# Patient Record
Sex: MALE | Race: WHITE | HISPANIC OR LATINO | URBAN - METROPOLITAN AREA
[De-identification: names, ages, dates, MRNs, and addresses within clinical notes are randomized per-mention and may not be internally consistent; named-entity substitution may affect disease eponyms.]

---

## 2017-09-25 ENCOUNTER — HOSPITAL ENCOUNTER (EMERGENCY)
Facility: HOSPITAL | Age: 32
Discharge: HOME | End: 2017-09-25
Attending: EMERGENCY MEDICINE
Payer: COMMERCIAL

## 2017-09-25 VITALS
TEMPERATURE: 99 F | BODY MASS INDEX: 28.79 KG/M2 | DIASTOLIC BLOOD PRESSURE: 79 MMHG | WEIGHT: 190 LBS | OXYGEN SATURATION: 100 % | HEIGHT: 68 IN | RESPIRATION RATE: 18 BRPM | SYSTOLIC BLOOD PRESSURE: 116 MMHG | HEART RATE: 61 BPM

## 2017-09-25 DIAGNOSIS — S39.012A BACK STRAIN, INITIAL ENCOUNTER: Primary | ICD-10-CM

## 2017-09-25 PROCEDURE — 99283 EMERGENCY DEPT VISIT LOW MDM: CPT | Mod: 25

## 2017-09-25 PROCEDURE — 96372 THER/PROPH/DIAG INJ SC/IM: CPT

## 2017-09-25 PROCEDURE — 63600175 PHARM REV CODE 636 W HCPCS: Performed by: PHYSICIAN ASSISTANT

## 2017-09-25 RX ORDER — IBUPROFEN 600 MG/1
600 TABLET ORAL EVERY 6 HOURS PRN
Qty: 20 TABLET | Refills: 0 | Status: SHIPPED | OUTPATIENT
Start: 2017-09-25 | End: 2017-09-30

## 2017-09-25 RX ORDER — ORPHENADRINE CITRATE 30 MG/ML
30 INJECTION INTRAMUSCULAR; INTRAVENOUS
Status: COMPLETED | OUTPATIENT
Start: 2017-09-25 | End: 2017-09-25

## 2017-09-25 RX ORDER — METHOCARBAMOL 500 MG/1
1000 TABLET, FILM COATED ORAL 3 TIMES DAILY
Qty: 30 TABLET | Refills: 0 | Status: SHIPPED | OUTPATIENT
Start: 2017-09-25 | End: 2017-09-30

## 2017-09-25 RX ORDER — KETOROLAC TROMETHAMINE 30 MG/ML
15 INJECTION, SOLUTION INTRAMUSCULAR; INTRAVENOUS
Status: COMPLETED | OUTPATIENT
Start: 2017-09-25 | End: 2017-09-25

## 2017-09-25 RX ADMIN — KETOROLAC TROMETHAMINE 15 MG: 30 INJECTION, SOLUTION INTRAMUSCULAR at 09:09

## 2017-09-25 RX ADMIN — ORPHENADRINE CITRATE 30 MG: 30 INJECTION INTRAMUSCULAR; INTRAVENOUS at 09:09

## 2017-09-25 NOTE — ED TRIAGE NOTES
C/o lower back pain x   2 days. Reports  Bending over picking up a laundry basket.  excerdine taken yesterday with no relief.

## 2017-09-25 NOTE — ED PROVIDER NOTES
Encounter Date: 9/25/2017    SCRIBE #1 NOTE: I, Elio Apolonia, am scribing for, and in the presence of, Lisa Botello PA-C. Other sections scribed: HPI, ROS.       History     Chief Complaint   Patient presents with    Back Pain     lower back pains after lifting heavy object Saturday.     CC: Back Pain  HPI: This 31 y.o. male presents to the ED c/o moderate to severe non-radiating pain in the middle of his low back that began after lifting up on a full laundry basket 2 days ago. He states that the pain has gradually worsened since onset. He states that he only has pain when he stands up or moves around. He denies Hx of back problems. He denies numbness, weakness, tingling, bowel or bladder incontinence, or saddle anesthesia.        The history is provided by the patient.     Review of patient's allergies indicates:  No Known Allergies  History reviewed. No pertinent past medical history.  History reviewed. No pertinent surgical history.  History reviewed. No pertinent family history.  Social History   Substance Use Topics    Smoking status: Never Smoker    Smokeless tobacco: Never Used    Alcohol use Yes      Comment:  3 times week     Review of Systems   Constitutional: Negative for chills and fever.   Eyes: Negative for pain and visual disturbance.   Respiratory: Negative for shortness of breath.    Gastrointestinal: Negative for abdominal pain, nausea and vomiting.        (-) bowel incontinence   Genitourinary:        (-) bladder incontinence   Musculoskeletal: Positive for back pain.   Skin: Negative for wound.   Neurological: Negative for weakness and numbness.        (-) paresthesias        Physical Exam     Initial Vitals [09/25/17 0819]   BP Pulse Resp Temp SpO2   (!) 150/67 63 16 97.9 °F (36.6 °C) 98 %      MAP       94.67         Physical Exam    Nursing note and vitals reviewed.  Constitutional: He appears well-developed and well-nourished. No distress.   Cardiovascular: Regular rhythm. Bradycardia  present.  Exam reveals no gallop and no friction rub.    No murmur heard.  Pulses:       Dorsalis pedis pulses are 2+ on the right side, and 2+ on the left side.   Pulmonary/Chest: Effort normal and breath sounds normal. He has no decreased breath sounds. He has no wheezes. He has no rhonchi. He has no rales.   Musculoskeletal:   No midline TTP of spine or paraspinal musculature. Negative SLR bilaterally. 5/5 strength bilaterally. Pain reproduced with bending forward and walking.    Neurological: He has normal strength. No sensory deficit. Gait normal.   Skin: Skin is warm and dry. No rash noted.   Psychiatric: He has a normal mood and affect.         ED Course   Procedures  Labs Reviewed - No data to display          Medical Decision Making:   Initial Assessment:   Patient is a 29-year-old male who presents for evaluation of 2 day history of midline lumbar back pain that occurred after lifting.  Patient denies history of back pain prior to 2 days ago.  He denies weakness, paresthesias, bowel or bladder incontinence or saddle anesthesia.  Patient is afebrile, well-appearing in acute distress on exam, there is no TTP to the spine at midline or the paraspinal musculature.  Straight-leg raise test negative bilaterally.  Pain is reproduced only with flexion of the spine and walking.  Normal strength.  No sensory deficits.  2+ DP pulses.  Considered but doubt fracture or dislocation, cauda equina, herniated disc.  This is likely back strain.  Patient given Toradol and Norflex in ED.  Discharge patient home with ibuprofen and Robaxin.  PCP follow-up in 2 days.  Ortho follow-up if symptoms persist or worsen.  ER return precautions discussed waiting worsening pain, bowel or bladder incontinence, saddle anesthesia or as needed.  I discussed this patient with Dr. Robison who agrees with the assessment and plan.            Scribe Attestation:   Scribe #1: I performed the above scribed service and the documentation accurately  describes the services I performed. I attest to the accuracy of the note.    Attending Attestation:           Physician Attestation for Scribe:  Physician Attestation Statement for Scribe #1: I, Lisa Munguia PA-C, reviewed documentation, as scribed by Elio Barksdale in my presence, and it is both accurate and complete.                 ED Course      Clinical Impression:   The encounter diagnosis was Back strain, initial encounter.                           Lisa Munguia PA-C  09/25/17 2048

## 2017-09-25 NOTE — DISCHARGE INSTRUCTIONS
Take Ibuprofen and Robaxin as prescribed for pain.    Follow up with primary care in 2 days. Ortho follow up if symptoms worsen or as needed.     Return to ER if you develop worsening symptoms, bowel or bladder incontinence, numbness or tingling in groin or rectal region,

## 2024-02-29 ENCOUNTER — HOSPITAL ENCOUNTER (INPATIENT)
Facility: HOSPITAL | Age: 39
LOS: 4 days | Discharge: HOME | DRG: 920 | End: 2024-03-04
Attending: EMERGENCY MEDICINE | Admitting: STUDENT IN AN ORGANIZED HEALTH CARE EDUCATION/TRAINING PROGRAM
Payer: COMMERCIAL

## 2024-02-29 DIAGNOSIS — R07.9 CHEST PAIN: ICD-10-CM

## 2024-02-29 DIAGNOSIS — R04.0 RECURRENT EPISTAXIS: Primary | ICD-10-CM

## 2024-02-29 DIAGNOSIS — R55 SYNCOPE, UNSPECIFIED SYNCOPE TYPE: ICD-10-CM

## 2024-02-29 DIAGNOSIS — R55 SYNCOPE AND COLLAPSE: ICD-10-CM

## 2024-02-29 DIAGNOSIS — R00.0 TACHYCARDIA: ICD-10-CM

## 2024-02-29 DIAGNOSIS — D64.9 SYMPTOMATIC ANEMIA: ICD-10-CM

## 2024-02-29 PROBLEM — D72.829 LEUKOCYTOSIS: Status: ACTIVE | Noted: 2024-02-29

## 2024-02-29 PROBLEM — N17.9 ACUTE KIDNEY INJURY: Status: ACTIVE | Noted: 2024-02-29

## 2024-02-29 LAB
ABO + RH BLD: NORMAL
ALBUMIN SERPL BCP-MCNC: 4.1 G/DL (ref 3.5–5.2)
ALP SERPL-CCNC: 81 U/L (ref 55–135)
ALT SERPL W/O P-5'-P-CCNC: 34 U/L (ref 10–44)
ANION GAP SERPL CALC-SCNC: 10 MMOL/L (ref 8–16)
ANION GAP SERPL CALC-SCNC: 8 MMOL/L (ref 8–16)
APTT PPP: 29.5 SEC (ref 21–32)
AST SERPL-CCNC: 22 U/L (ref 10–40)
BASOPHILS # BLD AUTO: 0.04 K/UL (ref 0–0.2)
BASOPHILS # BLD AUTO: 0.05 K/UL (ref 0–0.2)
BASOPHILS NFR BLD: 0.3 % (ref 0–1.9)
BASOPHILS NFR BLD: 0.4 % (ref 0–1.9)
BILIRUB SERPL-MCNC: 0.4 MG/DL (ref 0.1–1)
BLD GP AB SCN CELLS X3 SERPL QL: NORMAL
BUN SERPL-MCNC: 16 MG/DL (ref 6–20)
BUN SERPL-MCNC: 20 MG/DL (ref 6–20)
CALCIUM SERPL-MCNC: 8.7 MG/DL (ref 8.7–10.5)
CALCIUM SERPL-MCNC: 9.4 MG/DL (ref 8.7–10.5)
CHLORIDE SERPL-SCNC: 104 MMOL/L (ref 95–110)
CHLORIDE SERPL-SCNC: 107 MMOL/L (ref 95–110)
CO2 SERPL-SCNC: 20 MMOL/L (ref 23–29)
CO2 SERPL-SCNC: 26 MMOL/L (ref 23–29)
CREAT SERPL-MCNC: 1.4 MG/DL (ref 0.5–1.4)
CREAT SERPL-MCNC: 1.5 MG/DL (ref 0.5–1.4)
DIFFERENTIAL METHOD BLD: ABNORMAL
DIFFERENTIAL METHOD BLD: ABNORMAL
EOSINOPHIL # BLD AUTO: 0.2 K/UL (ref 0–0.5)
EOSINOPHIL # BLD AUTO: 0.3 K/UL (ref 0–0.5)
EOSINOPHIL NFR BLD: 1.5 % (ref 0–8)
EOSINOPHIL NFR BLD: 2.5 % (ref 0–8)
ERYTHROCYTE [DISTWIDTH] IN BLOOD BY AUTOMATED COUNT: 12.7 % (ref 11.5–14.5)
ERYTHROCYTE [DISTWIDTH] IN BLOOD BY AUTOMATED COUNT: 12.8 % (ref 11.5–14.5)
EST. GFR  (NO RACE VARIABLE): >60 ML/MIN/1.73 M^2
EST. GFR  (NO RACE VARIABLE): >60 ML/MIN/1.73 M^2
GLUCOSE SERPL-MCNC: 105 MG/DL (ref 70–110)
GLUCOSE SERPL-MCNC: 136 MG/DL (ref 70–110)
HCT VFR BLD AUTO: 44.8 % (ref 40–54)
HCT VFR BLD AUTO: 49.5 % (ref 40–54)
HGB BLD-MCNC: 15.7 G/DL (ref 14–18)
HGB BLD-MCNC: 17.3 G/DL (ref 14–18)
IMM GRANULOCYTES # BLD AUTO: 0.04 K/UL (ref 0–0.04)
IMM GRANULOCYTES # BLD AUTO: 0.11 K/UL (ref 0–0.04)
IMM GRANULOCYTES NFR BLD AUTO: 0.4 % (ref 0–0.5)
IMM GRANULOCYTES NFR BLD AUTO: 0.7 % (ref 0–0.5)
INR PPP: 1 (ref 0.8–1.2)
LYMPHOCYTES # BLD AUTO: 2.9 K/UL (ref 1–4.8)
LYMPHOCYTES # BLD AUTO: 4 K/UL (ref 1–4.8)
LYMPHOCYTES NFR BLD: 25.9 % (ref 18–48)
LYMPHOCYTES NFR BLD: 27.6 % (ref 18–48)
MCH RBC QN AUTO: 30.4 PG (ref 27–31)
MCH RBC QN AUTO: 31.6 PG (ref 27–31)
MCHC RBC AUTO-ENTMCNC: 34.9 G/DL (ref 32–36)
MCHC RBC AUTO-ENTMCNC: 35 G/DL (ref 32–36)
MCV RBC AUTO: 87 FL (ref 82–98)
MCV RBC AUTO: 90 FL (ref 82–98)
MONOCYTES # BLD AUTO: 1.1 K/UL (ref 0.3–1)
MONOCYTES # BLD AUTO: 1.7 K/UL (ref 0.3–1)
MONOCYTES NFR BLD: 10 % (ref 4–15)
MONOCYTES NFR BLD: 11.2 % (ref 4–15)
NEUTROPHILS # BLD AUTO: 6.3 K/UL (ref 1.8–7.7)
NEUTROPHILS # BLD AUTO: 9.3 K/UL (ref 1.8–7.7)
NEUTROPHILS NFR BLD: 59.1 % (ref 38–73)
NEUTROPHILS NFR BLD: 60.4 % (ref 38–73)
NRBC BLD-RTO: 0 /100 WBC
NRBC BLD-RTO: 0 /100 WBC
PLATELET # BLD AUTO: 413 K/UL (ref 150–450)
PLATELET # BLD AUTO: 426 K/UL (ref 150–450)
PMV BLD AUTO: 8.8 FL (ref 9.2–12.9)
PMV BLD AUTO: 9 FL (ref 9.2–12.9)
POCT GLUCOSE: 115 MG/DL (ref 70–110)
POTASSIUM SERPL-SCNC: 3.5 MMOL/L (ref 3.5–5.1)
POTASSIUM SERPL-SCNC: 4.3 MMOL/L (ref 3.5–5.1)
PROT SERPL-MCNC: 8.2 G/DL (ref 6–8.4)
PROTHROMBIN TIME: 11 SEC (ref 9–12.5)
RBC # BLD AUTO: 5.16 M/UL (ref 4.6–6.2)
RBC # BLD AUTO: 5.48 M/UL (ref 4.6–6.2)
SODIUM SERPL-SCNC: 137 MMOL/L (ref 136–145)
SODIUM SERPL-SCNC: 138 MMOL/L (ref 136–145)
SPECIMEN OUTDATE: NORMAL
WBC # BLD AUTO: 10.58 K/UL (ref 3.9–12.7)
WBC # BLD AUTO: 15.42 K/UL (ref 3.9–12.7)

## 2024-02-29 PROCEDURE — 093K8ZZ CONTROL BLEEDING IN NASAL MUCOSA AND SOFT TISSUE, VIA NATURAL OR ARTIFICIAL OPENING ENDOSCOPIC: ICD-10-PCS | Performed by: OTOLARYNGOLOGY

## 2024-02-29 PROCEDURE — 80048 BASIC METABOLIC PNL TOTAL CA: CPT | Mod: XB | Performed by: STUDENT IN AN ORGANIZED HEALTH CARE EDUCATION/TRAINING PROGRAM

## 2024-02-29 PROCEDURE — 99291 CRITICAL CARE FIRST HOUR: CPT | Mod: 25

## 2024-02-29 PROCEDURE — 86901 BLOOD TYPING SEROLOGIC RH(D): CPT | Performed by: STUDENT IN AN ORGANIZED HEALTH CARE EDUCATION/TRAINING PROGRAM

## 2024-02-29 PROCEDURE — 25000003 PHARM REV CODE 250: Performed by: STUDENT IN AN ORGANIZED HEALTH CARE EDUCATION/TRAINING PROGRAM

## 2024-02-29 PROCEDURE — 63600175 PHARM REV CODE 636 W HCPCS: Performed by: INTERNAL MEDICINE

## 2024-02-29 PROCEDURE — 25000003 PHARM REV CODE 250: Performed by: PHYSICIAN ASSISTANT

## 2024-02-29 PROCEDURE — 11000001 HC ACUTE MED/SURG PRIVATE ROOM

## 2024-02-29 PROCEDURE — 85025 COMPLETE CBC W/AUTO DIFF WBC: CPT | Performed by: STUDENT IN AN ORGANIZED HEALTH CARE EDUCATION/TRAINING PROGRAM

## 2024-02-29 PROCEDURE — 63600175 PHARM REV CODE 636 W HCPCS: Performed by: STUDENT IN AN ORGANIZED HEALTH CARE EDUCATION/TRAINING PROGRAM

## 2024-02-29 PROCEDURE — 30901 CONTROL OF NOSEBLEED: CPT | Mod: 59,LT,, | Performed by: OTOLARYNGOLOGY

## 2024-02-29 PROCEDURE — 85610 PROTHROMBIN TIME: CPT

## 2024-02-29 PROCEDURE — 96361 HYDRATE IV INFUSION ADD-ON: CPT

## 2024-02-29 PROCEDURE — 99223 1ST HOSP IP/OBS HIGH 75: CPT | Mod: 25,,, | Performed by: OTOLARYNGOLOGY

## 2024-02-29 PROCEDURE — 80053 COMPREHEN METABOLIC PANEL: CPT

## 2024-02-29 PROCEDURE — 93005 ELECTROCARDIOGRAM TRACING: CPT

## 2024-02-29 PROCEDURE — 31231 NASAL ENDOSCOPY DX: CPT | Mod: ,,, | Performed by: OTOLARYNGOLOGY

## 2024-02-29 PROCEDURE — 2Y41X5Z PACKING OF NASAL REGION USING PACKING MATERIAL: ICD-10-PCS | Performed by: OTOLARYNGOLOGY

## 2024-02-29 PROCEDURE — 85025 COMPLETE CBC W/AUTO DIFF WBC: CPT | Mod: 91

## 2024-02-29 PROCEDURE — 93010 ELECTROCARDIOGRAM REPORT: CPT | Mod: ,,, | Performed by: INTERNAL MEDICINE

## 2024-02-29 PROCEDURE — 82962 GLUCOSE BLOOD TEST: CPT

## 2024-02-29 PROCEDURE — 96374 THER/PROPH/DIAG INJ IV PUSH: CPT

## 2024-02-29 PROCEDURE — 85730 THROMBOPLASTIN TIME PARTIAL: CPT

## 2024-02-29 RX ORDER — ONDANSETRON HYDROCHLORIDE 2 MG/ML
4 INJECTION, SOLUTION INTRAVENOUS EVERY 8 HOURS PRN
Status: DISCONTINUED | OUTPATIENT
Start: 2024-02-29 | End: 2024-03-04 | Stop reason: HOSPADM

## 2024-02-29 RX ORDER — SODIUM CHLORIDE, SODIUM LACTATE, POTASSIUM CHLORIDE, CALCIUM CHLORIDE 600; 310; 30; 20 MG/100ML; MG/100ML; MG/100ML; MG/100ML
INJECTION, SOLUTION INTRAVENOUS CONTINUOUS
Status: DISCONTINUED | OUTPATIENT
Start: 2024-02-29 | End: 2024-03-02

## 2024-02-29 RX ORDER — ACETAMINOPHEN 325 MG/1
650 TABLET ORAL EVERY 4 HOURS PRN
Status: DISCONTINUED | OUTPATIENT
Start: 2024-02-29 | End: 2024-03-04 | Stop reason: HOSPADM

## 2024-02-29 RX ORDER — ALUMINUM HYDROXIDE, MAGNESIUM HYDROXIDE, AND SIMETHICONE 1200; 120; 1200 MG/30ML; MG/30ML; MG/30ML
30 SUSPENSION ORAL 4 TIMES DAILY PRN
Status: DISCONTINUED | OUTPATIENT
Start: 2024-02-29 | End: 2024-03-04 | Stop reason: HOSPADM

## 2024-02-29 RX ORDER — TALC
6 POWDER (GRAM) TOPICAL NIGHTLY PRN
Status: DISCONTINUED | OUTPATIENT
Start: 2024-02-29 | End: 2024-03-04 | Stop reason: HOSPADM

## 2024-02-29 RX ORDER — OXYMETAZOLINE HCL 0.05 %
1 SPRAY, NON-AEROSOL (ML) NASAL
Status: COMPLETED | OUTPATIENT
Start: 2024-02-29 | End: 2024-02-29

## 2024-02-29 RX ORDER — SODIUM CHLORIDE 0.9 % (FLUSH) 0.9 %
10 SYRINGE (ML) INJECTION EVERY 12 HOURS PRN
Status: DISCONTINUED | OUTPATIENT
Start: 2024-02-29 | End: 2024-03-04 | Stop reason: HOSPADM

## 2024-02-29 RX ORDER — GLUCAGON 1 MG
1 KIT INJECTION
Status: DISCONTINUED | OUTPATIENT
Start: 2024-02-29 | End: 2024-03-04 | Stop reason: HOSPADM

## 2024-02-29 RX ORDER — IBUPROFEN 200 MG
24 TABLET ORAL
Status: DISCONTINUED | OUTPATIENT
Start: 2024-02-29 | End: 2024-03-04 | Stop reason: HOSPADM

## 2024-02-29 RX ORDER — ONDANSETRON HYDROCHLORIDE 2 MG/ML
4 INJECTION, SOLUTION INTRAVENOUS
Status: COMPLETED | OUTPATIENT
Start: 2024-02-29 | End: 2024-02-29

## 2024-02-29 RX ORDER — IBUPROFEN 200 MG
16 TABLET ORAL
Status: DISCONTINUED | OUTPATIENT
Start: 2024-02-29 | End: 2024-03-04 | Stop reason: HOSPADM

## 2024-02-29 RX ORDER — NALOXONE HCL 0.4 MG/ML
0.02 VIAL (ML) INJECTION
Status: DISCONTINUED | OUTPATIENT
Start: 2024-02-29 | End: 2024-03-04 | Stop reason: HOSPADM

## 2024-02-29 RX ADMIN — ONDANSETRON 4 MG: 2 INJECTION INTRAMUSCULAR; INTRAVENOUS at 02:02

## 2024-02-29 RX ADMIN — SODIUM CHLORIDE, POTASSIUM CHLORIDE, SODIUM LACTATE AND CALCIUM CHLORIDE: 600; 310; 30; 20 INJECTION, SOLUTION INTRAVENOUS at 10:02

## 2024-02-29 RX ADMIN — SODIUM CHLORIDE 1000 ML: 9 INJECTION, SOLUTION INTRAVENOUS at 05:02

## 2024-02-29 RX ADMIN — SODIUM CHLORIDE 1000 ML: 9 INJECTION, SOLUTION INTRAVENOUS at 03:02

## 2024-02-29 RX ADMIN — Medication 1 SPRAY: at 02:02

## 2024-02-29 NOTE — ED PROVIDER NOTES
Encounter Date: 2/29/2024       History     Chief Complaint   Patient presents with    Epistaxis     Pt reports having septoplasty surgery on Friday and began having a continuous nose bleed starting about 30-45 min PTA. Pt denies pain.      38-year-old male with no reported past medical history presents to the ED today for evaluation of nosebleed.  Patient reports he [facility]  He reports he was sent home without any nasal packing.  He was doing well until 45 minutes prior to arrival to the emergency room, when he started to experience severe bleeding from both nostrils of his nose.  He was unable to get the bleeding control which is what brought him into the emergency department.  At this time he denies feeling short of breath, lightheaded, dizzy.  Denies taking any blood thinners on a daily basis.  He is not currently in any pain.    The history is provided by the patient. No  was used.     Review of patient's allergies indicates:  No Known Allergies  History reviewed. No pertinent past medical history.  History reviewed. No pertinent surgical history.  History reviewed. No pertinent family history.  Social History     Tobacco Use    Smoking status: Never    Smokeless tobacco: Never   Substance Use Topics    Alcohol use: Yes     Comment:  3 times week     Review of Systems   Constitutional:  Negative for chills, fatigue and fever.   HENT:  Positive for nosebleeds. Negative for congestion, sinus pressure, sneezing and sore throat.    Eyes:  Negative for visual disturbance.   Respiratory:  Negative for cough and shortness of breath.    Cardiovascular:  Negative for chest pain.   Gastrointestinal:  Negative for abdominal pain, nausea and vomiting.   Genitourinary:  Negative for dysuria.   Musculoskeletal:  Negative for back pain.   Skin:  Negative for rash.   Neurological:  Negative for dizziness, syncope, weakness and light-headedness.    Hematological:  Does not bruise/bleed easily.       Physical Exam     Initial Vitals [02/29/24 1333]   BP Pulse Resp Temp SpO2   (!) 146/93 110 18 98 °F (36.7 °C) 98 %      MAP       --         Physical Exam    Nursing note and vitals reviewed.  Constitutional: He appears well-developed and well-nourished. He is not diaphoretic. He appears distressed.   HENT:   Head: Normocephalic and atraumatic.   Right Ear: External ear normal.   Left Ear: External ear normal.   Nose: Epistaxis is observed.   Mouth/Throat: No trismus in the jaw.   Profuse blood coming from both nares.  No visible identification of source of bleeding due to obstructed view.  There is blood coming from the left tear duct as well.  No blood visualized in the bilateral ear canals and no hemotympanum visualized.   Cardiovascular:  Regular rhythm.   Tachycardia present.         Pulmonary/Chest: No respiratory distress.   Abdominal: He exhibits no distension.     Neurological: He is alert and oriented to person, place, and time. GCS score is 15. GCS eye subscore is 4. GCS verbal subscore is 5. GCS motor subscore is 6.   Skin: Skin is warm and dry.   Psychiatric: He has a normal mood and affect. His behavior is normal. Thought content normal.         ED Course   Critical Care    Date/Time: 2/29/2024 7:00 PM    Performed by: Marleny Daniel DO  Authorized by: Marleny Daniel DO  Direct patient critical care time: 25 minutes  Additional history critical care time: 10 minutes  Ordering / reviewing critical care time: 10 minutes  Documentation critical care time: 5 minutes  Consulting other physicians critical care time: 10 minutes  Total critical care time (exclusive of procedural time) : 60 minutes  Critical care time was exclusive of separately billable procedures and treating other patients and teaching time.  Critical care was necessary to treat or prevent imminent or life-threatening deterioration of the following conditions:  shock.  Critical care was time spent personally by me on the following activities: development of treatment plan with patient or surrogate, discussions with consultants, interpretation of cardiac output measurements, evaluation of patient's response to treatment, examination of patient, obtaining history from patient or surrogate, ordering and performing treatments and interventions, ordering and review of laboratory studies, re-evaluation of patient's condition, pulse oximetry and review of old charts.        Labs Reviewed   CBC W/ AUTO DIFFERENTIAL - Abnormal; Notable for the following components:       Result Value    MCH 31.6 (*)     MPV 8.8 (*)     Mono # 1.1 (*)     All other components within normal limits   CBC W/ AUTO DIFFERENTIAL - Abnormal; Notable for the following components:    WBC 15.42 (*)     MPV 9.0 (*)     Immature Granulocytes 0.7 (*)     Gran # (ANC) 9.3 (*)     Immature Grans (Abs) 0.11 (*)     Mono # 1.7 (*)     All other components within normal limits   BASIC METABOLIC PANEL - Abnormal; Notable for the following components:    CO2 20 (*)     Glucose 136 (*)     Creatinine 1.5 (*)     All other components within normal limits   POCT GLUCOSE - Abnormal; Notable for the following components:    POCT Glucose 115 (*)     All other components within normal limits   COMPREHENSIVE METABOLIC PANEL   PROTIME-INR   APTT   TYPE & SCREEN   POCT GLUCOSE MONITORING CONTINUOUS     EKG Readings: (Independently Interpreted)   Sinus tachycardia with a rate of 103 beats per minute, nonspecific T-wave inversions in leads 3, AVF and V6  with no obvious reciprocal changes.  No obvious acute ST segment changes.  No old EKG in epic to compare.       Imaging Results    None          Medications   oxymetazoline 0.05 % nasal spray 1 spray (1 spray Each Nostril Given 2/29/24 1414)   oxymetazoline 0.05 % nasal spray 1 spray (1 spray Each Nostril Given 2/29/24 1409)   ondansetron injection 4 mg (4 mg Intravenous Given  2/29/24 1431)   sodium chloride 0.9% bolus 1,000 mL 1,000 mL (0 mLs Intravenous Stopped 2/29/24 1617)   sodium chloride 0.9% bolus 1,000 mL 1,000 mL (0 mLs Intravenous Stopped 2/29/24 1802)     Medical Decision Making  This is a 38-year-old male with no reported past medical history presents to the ED today for evaluation of nosebleed.  Patient reports he [facility].  He reports he was sent home without any nasal packing.  He was doing well until 45 minutes prior to arrival to the emergency room, when he started to experience severe bleeding from both nostrils of his nose.  He was unable to get the bleeding control which is what brought him into the emergency department.  Upon initial evaluation there is profuse blood coming from both nares.  He was obstructed and I can not identify a source of bleeding.  I did immediately seek help from my attending, Dr. Donny santamaria, who assisted in his case from this point onward.  ENT consulted and Dr. Quiroz came down to evaluate the pt and help stop bleeding. Afrin and absorbable packing was used, however pt blew out the packing shortly after it was placed. He had a possible seizure vs vasovagal syncopal episode in the ED (after he blew out the packing) and has subsequently had another presyncopal episode while in ED. Hgb initally 17.3 upong arrival, then 15.7 on recheck. He has gotten 2 liters of NS due to low BP. Dr. Quiroz did come see him again and placed additional absorbable packing that is now in place. Attending Dr. Daniel advises for pt to be observed tonight given decrease in hemoglobin and syncopal episode. Dr. Quiroz agreeable check on pt again in the morning.  I spoke to Hospital Medicine, who is agreeable to admit the patient to inpatient under attending Dr. John Hyde.  Patient stable and feeling better at time of hospital admission.    Of note: Differential diagnosis to include but  not limited to:  Epistaxis, recurrent epistaxis, symptomatic anemia    Helen Dove PA-C    DISCLAIMER: This note was prepared with [employer] voice recognition transcription software. Garbled syntax, mangled pronouns, and other bizarre constructions may be attributed to that software system. If you have any questions regarding information in this note please contact me.           Amount and/or Complexity of Data Reviewed  Labs: ordered.    Risk  OTC drugs.  Prescription drug management.  Decision regarding hospitalization.              Attending Attestation:     Physician Attestation Statement for NP/PA:       Other NP/PA Attestation Additions:      Medical Decision Making:  [facility]  This is an emergent evaluation of a 38 y.o. male who had a [facility] who presents with persistent nosebleed that started today prior to ED arrival.  No attempts at treatment prior to ED arrival.  Initial vitals in the ED  [02/29/24 1333]  BP: (!) 146/93  Pulse: 110  Resp: 18  Temp: 98 °F (36.7 °C)  SpO2: 98 % .     Physical exam reveals a male who appears to be in no respiratory distress however he was standing over the sink with profuse bleeding from bilateral nostrils worse on the left.  Unable to fully visualize nostrils given large amount of blood.  There is intermittent large blood clots present.  Patient also appears to have small amount of blood coming from the oropharynx.  Patient also tachycardic with a regular rhythm.  No focal neurological deficits.  Moving all extremities.  No obvious pallor or other external injuries.  DDx includes but is not limited to postop nosebleed, anterior versus posterior epistaxis, symptomatic anemia. Also considered but clinically less likely to be of arrhythmia, ACS, stroke.  Patient was consult to ENT and was evaluated by Dr. Quiroz.  She was able to place Surgicel packing.  After patient initially packed, he had an episode where his family reported  whole-body shaking and patient briefly losing consciousness.  On my assessment of the patient.  He was awake alert with no seizure-like activity.  He had accidentally removed the nasal packing.  Had minimal nosebleeding at that time.  Rechecked labs which showed his hemoglobin had dropped from 17.3 to 15 0.7.  Also had a minimal leukocytosis which is likely stress induced.  Remainder of labs unremarkable.  Patient was treated with IV fluid hydration.  Again  came down and place more packing.  Decision made to place the patient on hospital medicine service given drop in hemoglobin with significant bleeding in the ED today.  Patient and family are aware and agreeable to plan.  Vitals with improved tachycardia and remainder vitals also reassuring.    Marleny Daniel D.O  EMERGENCY MEDICINE   02/29/2024      This chart was completed using dictation software, as a result there may be some transcription errors                                     Clinical Impression:  Final diagnoses:  [R00.0] Tachycardia  [R04.0] Recurrent epistaxis (Primary)  [R55] Syncope, unspecified syncope type  [D64.9] Symptomatic anemia          ED Disposition Condition    Admit Stable                Helen Dove PA-C  02/29/24 1935       Marleny Daniel, DO  02/29/24 2021       Marleny Daniel, DO  02/29/24 2124

## 2024-02-29 NOTE — ED TRIAGE NOTES
Mark Maza, a 38 y.o. male presents to the ED w/ complaint of epistaxis w/ clots present. Pt reports having septoplasty on Friday and began having continuous nose bleed 30 mins before arrival to ED. Pt states taking Excedrin x AM but nothing PTA. Pt denies pain. AAOx4 and NADN.

## 2024-02-29 NOTE — ED TRIAGE NOTES
Pt reports to ED for nose bleed for approximately 30/45 minute PTA. Pt states [facility]. Pt denies dizziness, HA, lightheaded.   Bleeding uncontrolled with pressure

## 2024-03-01 LAB
ANION GAP SERPL CALC-SCNC: 7 MMOL/L (ref 8–16)
ASCENDING AORTA: 3.1 CM
AV INDEX (PROSTH): 0.88
AV MEAN GRADIENT: 6 MMHG
AV PEAK GRADIENT: 10 MMHG
AV VALVE AREA BY VELOCITY RATIO: 2.86 CM²
AV VALVE AREA: 2.81 CM²
AV VELOCITY RATIO: 0.89
BASOPHILS # BLD AUTO: 0.02 K/UL (ref 0–0.2)
BASOPHILS NFR BLD: 0.2 % (ref 0–1.9)
BSA FOR ECHO PROCEDURE: 2.26 M2
BUN SERPL-MCNC: 29 MG/DL (ref 6–20)
CALCIUM SERPL-MCNC: 8.2 MG/DL (ref 8.7–10.5)
CHLORIDE SERPL-SCNC: 108 MMOL/L (ref 95–110)
CO2 SERPL-SCNC: 22 MMOL/L (ref 23–29)
CREAT SERPL-MCNC: 1.1 MG/DL (ref 0.5–1.4)
CV ECHO LV RWT: 0.43 CM
DIFFERENTIAL METHOD BLD: ABNORMAL
DOP CALC AO PEAK VEL: 1.58 M/S
DOP CALC AO VTI: 24.7 CM
DOP CALC LVOT AREA: 3.2 CM2
DOP CALC LVOT DIAMETER: 2.02 CM
DOP CALC LVOT PEAK VEL: 1.41 M/S
DOP CALC LVOT STROKE VOLUME: 69.51 CM3
DOP CALC MV VTI: 22.3 CM
DOP CALCLVOT PEAK VEL VTI: 21.7 CM
E WAVE DECELERATION TIME: 151.96 MSEC
E/A RATIO: 1.5
E/E' RATIO: 7.2 M/S
ECHO LV POSTERIOR WALL: 0.99 CM (ref 0.6–1.1)
EOSINOPHIL # BLD AUTO: 0.1 K/UL (ref 0–0.5)
EOSINOPHIL NFR BLD: 1.1 % (ref 0–8)
ERYTHROCYTE [DISTWIDTH] IN BLOOD BY AUTOMATED COUNT: 13.2 % (ref 11.5–14.5)
EST. GFR  (NO RACE VARIABLE): >60 ML/MIN/1.73 M^2
FRACTIONAL SHORTENING: 29 % (ref 28–44)
GLUCOSE SERPL-MCNC: 99 MG/DL (ref 70–110)
HCT VFR BLD AUTO: 36.2 % (ref 40–54)
HGB BLD-MCNC: 12.2 G/DL (ref 14–18)
IMM GRANULOCYTES # BLD AUTO: 0.09 K/UL (ref 0–0.04)
IMM GRANULOCYTES NFR BLD AUTO: 0.9 % (ref 0–0.5)
INTERVENTRICULAR SEPTUM: 0.92 CM (ref 0.6–1.1)
IVC DIAMETER: 1.58 CM
IVRT: 114.18 MSEC
LA MAJOR: 5.63 CM
LA MINOR: 5.7 CM
LA WIDTH: 3.7 CM
LEFT ATRIUM SIZE: 3.9 CM
LEFT ATRIUM VOLUME INDEX: 31.7 ML/M2
LEFT ATRIUM VOLUME: 69.48 CM3
LEFT INTERNAL DIMENSION IN SYSTOLE: 3.3 CM (ref 2.1–4)
LEFT VENTRICLE DIASTOLIC VOLUME INDEX: 45.24 ML/M2
LEFT VENTRICLE DIASTOLIC VOLUME: 99.07 ML
LEFT VENTRICLE MASS INDEX: 69 G/M2
LEFT VENTRICLE SYSTOLIC VOLUME INDEX: 20.1 ML/M2
LEFT VENTRICLE SYSTOLIC VOLUME: 44.07 ML
LEFT VENTRICULAR INTERNAL DIMENSION IN DIASTOLE: 4.63 CM (ref 3.5–6)
LEFT VENTRICULAR MASS: 150.75 G
LV LATERAL E/E' RATIO: 5.54 M/S
LV SEPTAL E/E' RATIO: 10.29 M/S
LVOT MG: 3.87 MMHG
LVOT MV: 0.91 CM/S
LYMPHOCYTES # BLD AUTO: 2.2 K/UL (ref 1–4.8)
LYMPHOCYTES NFR BLD: 22.4 % (ref 18–48)
MCH RBC QN AUTO: 30.6 PG (ref 27–31)
MCHC RBC AUTO-ENTMCNC: 33.7 G/DL (ref 32–36)
MCV RBC AUTO: 91 FL (ref 82–98)
MONOCYTES # BLD AUTO: 0.9 K/UL (ref 0.3–1)
MONOCYTES NFR BLD: 9 % (ref 4–15)
MV MEAN GRADIENT: 1 MMHG
MV PEAK A VEL: 0.48 M/S
MV PEAK E VEL: 0.72 M/S
MV PEAK GRADIENT: 2 MMHG
MV STENOSIS PRESSURE HALF TIME: 44.07 MS
MV VALVE AREA BY CONTINUITY EQUATION: 3.12 CM2
MV VALVE AREA P 1/2 METHOD: 4.99 CM2
NEUTROPHILS # BLD AUTO: 6.4 K/UL (ref 1.8–7.7)
NEUTROPHILS NFR BLD: 66.4 % (ref 38–73)
NRBC BLD-RTO: 0 /100 WBC
PISA TR MAX VEL: 2.19 M/S
PLATELET # BLD AUTO: 311 K/UL (ref 150–450)
PMV BLD AUTO: 9.2 FL (ref 9.2–12.9)
POTASSIUM SERPL-SCNC: 4 MMOL/L (ref 3.5–5.1)
PV PEAK GRADIENT: 6 MMHG
PV PEAK VELOCITY: 1.19 M/S
RA MAJOR: 5.98 CM
RA PRESSURE ESTIMATED: 3 MMHG
RA WIDTH: 3.3 CM
RBC # BLD AUTO: 3.99 M/UL (ref 4.6–6.2)
RIGHT VENTRICULAR END-DIASTOLIC DIMENSION: 3.56 CM
RV TB RVSP: 5 MMHG
RV TISSUE DOPPLER FREE WALL SYSTOLIC VELOCITY 1 (APICAL 4 CHAMBER VIEW): 11.07 CM/S
SINUS: 2.74 CM
SODIUM SERPL-SCNC: 137 MMOL/L (ref 136–145)
TDI LATERAL: 0.13 M/S
TDI SEPTAL: 0.07 M/S
TDI: 0.1 M/S
TR MAX PG: 19 MMHG
TRICUSPID ANNULAR PLANE SYSTOLIC EXCURSION: 2.02 CM
TV REST PULMONARY ARTERY PRESSURE: 22 MMHG
WBC # BLD AUTO: 9.7 K/UL (ref 3.9–12.7)
Z-SCORE OF LEFT VENTRICULAR DIMENSION IN END DIASTOLE: -4.69
Z-SCORE OF LEFT VENTRICULAR DIMENSION IN END SYSTOLE: -2.45

## 2024-03-01 PROCEDURE — 2Y41X5Z PACKING OF NASAL REGION USING PACKING MATERIAL: ICD-10-PCS | Performed by: OTOLARYNGOLOGY

## 2024-03-01 PROCEDURE — 99232 SBSQ HOSP IP/OBS MODERATE 35: CPT | Mod: 25,,, | Performed by: OTOLARYNGOLOGY

## 2024-03-01 PROCEDURE — 36415 COLL VENOUS BLD VENIPUNCTURE: CPT | Performed by: INTERNAL MEDICINE

## 2024-03-01 PROCEDURE — 30905 CONTROL OF NOSEBLEED: CPT | Mod: ,,, | Performed by: OTOLARYNGOLOGY

## 2024-03-01 PROCEDURE — 85025 COMPLETE CBC W/AUTO DIFF WBC: CPT | Performed by: INTERNAL MEDICINE

## 2024-03-01 PROCEDURE — 80048 BASIC METABOLIC PNL TOTAL CA: CPT | Performed by: INTERNAL MEDICINE

## 2024-03-01 PROCEDURE — 11000001 HC ACUTE MED/SURG PRIVATE ROOM

## 2024-03-01 PROCEDURE — 63600175 PHARM REV CODE 636 W HCPCS: Mod: JZ,JG | Performed by: STUDENT IN AN ORGANIZED HEALTH CARE EDUCATION/TRAINING PROGRAM

## 2024-03-01 PROCEDURE — 25000003 PHARM REV CODE 250: Performed by: STUDENT IN AN ORGANIZED HEALTH CARE EDUCATION/TRAINING PROGRAM

## 2024-03-01 PROCEDURE — 63600175 PHARM REV CODE 636 W HCPCS: Performed by: INTERNAL MEDICINE

## 2024-03-01 RX ORDER — MORPHINE SULFATE 4 MG/ML
2 INJECTION, SOLUTION INTRAMUSCULAR; INTRAVENOUS ONCE
Status: COMPLETED | OUTPATIENT
Start: 2024-03-01 | End: 2024-03-01

## 2024-03-01 RX ORDER — MORPHINE SULFATE 4 MG/ML
4 INJECTION, SOLUTION INTRAMUSCULAR; INTRAVENOUS EVERY 4 HOURS PRN
Status: DISCONTINUED | OUTPATIENT
Start: 2024-03-01 | End: 2024-03-02

## 2024-03-01 RX ORDER — AMOXICILLIN AND CLAVULANATE POTASSIUM 875; 125 MG/1; MG/1
1 TABLET, FILM COATED ORAL EVERY 12 HOURS
Status: DISCONTINUED | OUTPATIENT
Start: 2024-03-01 | End: 2024-03-04 | Stop reason: HOSPADM

## 2024-03-01 RX ORDER — ONDANSETRON HYDROCHLORIDE 2 MG/ML
4 INJECTION, SOLUTION INTRAVENOUS ONCE
Status: DISCONTINUED | OUTPATIENT
Start: 2024-03-01 | End: 2024-03-02

## 2024-03-01 RX ADMIN — SODIUM CHLORIDE, POTASSIUM CHLORIDE, SODIUM LACTATE AND CALCIUM CHLORIDE: 600; 310; 30; 20 INJECTION, SOLUTION INTRAVENOUS at 06:03

## 2024-03-01 RX ADMIN — MORPHINE SULFATE 4 MG: 4 INJECTION, SOLUTION INTRAMUSCULAR; INTRAVENOUS at 08:03

## 2024-03-01 RX ADMIN — AMOXICILLIN AND CLAVULANATE POTASSIUM 1 TABLET: 875; 125 TABLET, FILM COATED ORAL at 08:03

## 2024-03-01 RX ADMIN — MORPHINE SULFATE 2 MG: 4 INJECTION, SOLUTION INTRAMUSCULAR; INTRAVENOUS at 10:03

## 2024-03-01 RX ADMIN — MORPHINE SULFATE 4 MG: 4 INJECTION, SOLUTION INTRAMUSCULAR; INTRAVENOUS at 11:03

## 2024-03-01 RX ADMIN — SODIUM CHLORIDE, POTASSIUM CHLORIDE, SODIUM LACTATE AND CALCIUM CHLORIDE: 600; 310; 30; 20 INJECTION, SOLUTION INTRAVENOUS at 07:03

## 2024-03-01 RX ADMIN — ONDANSETRON 4 MG: 2 INJECTION INTRAMUSCULAR; INTRAVENOUS at 10:03

## 2024-03-01 RX ADMIN — AMOXICILLIN AND CLAVULANATE POTASSIUM 1 TABLET: 875; 125 TABLET, FILM COATED ORAL at 11:03

## 2024-03-01 RX ADMIN — MORPHINE SULFATE 4 MG: 4 INJECTION, SOLUTION INTRAMUSCULAR; INTRAVENOUS at 04:03

## 2024-03-01 NOTE — PROGRESS NOTES
[facility]  Progress Note    Patient Name: Mark Maza  MRN: [id number]  Patient Class: IP- Inpatient   Admission Date: 2/29/2024  Length of Stay: 1 days  Attending Physician: Octavio Glynn MD  Primary Care Provider: Francisca, Primary Doctor        Subjective:     Principal Problem:Epistaxis        HPI:  Mr Maza is a pleasant 48-year-old male who presents with epistaxis.  Denies any medical conditions, does not take any scheduled medications, reports that he had surgery for deviated septum on Friday, 2/23/2024, and was doing well postoperatively, did not have any packing or splints after surgery.  Denies any prior other head and neck surgeries.  Denies any hematological issues or bleeding disorders.  Today he started experiencing severe bleeding through his nostrils that prompted him to come to the emergency room.  Denies taking any anticoagulants but states that he took Excedrin migraine for pain control.    On arrival in the ER, he was noted to be tachycardic, afebrile.  CBC shows white count of over 15,000, probably reactive.  Hemoglobin 15.7 and normal platelets.  BMP with creatinine of 1.5 without previous known renal issues.    He was promptly evaluated by ENT at bedside in the ER, and Dr. Quiroz evaluated him first time with subsequent control of bleeding with Afrin, absorbable packing however shortly thereafter he had what appears to be a syncopal event (less likely seizure) and his packing came out.  ENT came by to see the patient again due to uncontrolled bleeding and a rigid endoscopy was performed, no active bleeding was noted, left-sided Surgicel was placed.  Antibiotics not recommended if absorbable packing in place, but if he needs a Rhino Rocket future he may need antibiotics.    Due to recurrent nature of his bleeding, and syncope, admission was requested for further evaluation and treatment.    Overview/Hospital Course:  No notes on file    Interval History: no  acute issues overnight, however this AM patient developed significant nose bleed with no improvement  after afrin sprays. Dr. Quiroz to bedside and placed bilateral nose packing. Started on antibiotics.     Review of Systems  Objective:     Vital Signs (Most Recent):  Temp: 98.8 °F (37.1 °C) (03/01/24 1146)  Pulse: 101 (03/01/24 1459)  Resp: 18 (03/01/24 1146)  BP: (!) 163/91 (03/01/24 1146)  SpO2: 100 % (03/01/24 1146) Vital Signs (24h Range):  Temp:  [98.5 °F (36.9 °C)-98.8 °F (37.1 °C)] 98.8 °F (37.1 °C)  Pulse:  [] 101  Resp:  [15-20] 18  SpO2:  [97 %-100 %] 100 %  BP: (102-163)/(57-91) 163/91     Weight: 104.6 kg (230 lb 9.6 oz)  Body mass index is 34.05 kg/m².    Intake/Output Summary (Last 24 hours) at 3/1/2024 1503  Last data filed at 3/1/2024 0617  Gross per 24 hour   Intake 1263.04 ml   Output 1000 ml   Net 263.04 ml         Physical Exam  Vitals and nursing note reviewed.   Constitutional:       General: He is not in acute distress.     Appearance: He is ill-appearing.   HENT:      Head:      Comments: Ongoing nose bleeding, now with bilateral nasal packing  Cardiovascular:      Rate and Rhythm: Normal rate and regular rhythm.      Pulses: Normal pulses.   Pulmonary:      Effort: Pulmonary effort is normal.   Abdominal:      General: There is no distension.   Musculoskeletal:         General: No swelling.   Skin:     General: Skin is warm.   Neurological:      General: No focal deficit present.      Mental Status: He is alert.   Psychiatric:         Mood and Affect: Mood normal.         Thought Content: Thought content normal.             Significant Labs: All pertinent labs within the past 24 hours have been reviewed.    Significant Imaging: I have reviewed all pertinent imaging results/findings within the past 24 hours.    Assessment/Plan:      * Epistaxis  -post septoplasty 6 days ago.  -ENT evaluated the patient.  Follow further recommendations.  -Bleeding controlled after 2 interventions in  the ER.  - Repeat bleeding on morning of 3/1 requiring bilateral nasal packing by ENT. These will need to stay in place over the weekend and plan to monitor patient. Started on augmentin for staph aureus coverage. Discussed with Dr. Quiroz.  -Monitor hemoglobin.      Leukocytosis  -Likely reactive.  -resolved        Syncope and collapse  -Happened after first nasal packing.  -Most likely vasovagal.  -Echocardiogram for completion. Normal EF      Acute kidney injury  -Continue IV fluids, likely prerenal.  - resolved      VTE Risk Mitigation (From admission, onward)           Ordered     IP VTE LOW RISK PATIENT  Once         02/29/24 1949     Place sequential compression device  Until discontinued         02/29/24 1949                    Discharge Planning   ELISEO:      Code Status: Full Code   Is the patient medically ready for discharge?:     Reason for patient still in hospital (select all that apply): Treatment                     Octavio Glynn MD  Department of Hospital Medicine   [facility]

## 2024-03-01 NOTE — ADMISSIONCARE
AdmissionCare    Guideline: Head and Neck Disease, Inpatient    Based on the indications selected for the patient, the bed status of Inpatient was determined to be MET    The following indications were selected as present at the time of evaluation of the patient:      - Epistaxis that requires inpatient care, as indicated by 1 or more of the following:    - Inpatient monitoring needed (beyond observation care) due to high risk of severe recurrent bleeding (eg, irreversible overanticoagulation, bleeding diathesis)    - Posterior bleeding source    AdmissionCare documentation entered by: Asmita Monzon    [facility], 27th edition, Copyright © 2023 [facility] [employer], [facility] All Rights Reserved.  [id number]

## 2024-03-01 NOTE — H&P
[facility] Medicine  History & Physical    Patient Name: Mark Maza  MRN: [id number]  Patient Class: IP- Inpatient  Admission Date: 2/29/2024  Attending Physician: John Hyde III, MD   Primary Care Provider: Francisca, Primary Doctor         Patient information was obtained from patient and ER records.     Subjective:     Principal Problem:<principal problem not specified>    Chief Complaint:   Chief Complaint   Patient presents with    Epistaxis     Pt reports having septoplasty surgery on Friday and began having a continuous nose bleed starting about 30-45 min PTA. Pt denies pain.         HPI: Mr Maza is a pleasant 48-year-old male who presents with epistaxis.  Denies any medical conditions, does not take any scheduled medications, reports that he had surgery for deviated septum on Friday, 2/23/2024, and was doing well postoperatively, did not have any packing or splints after surgery.  Denies any prior other head and neck surgeries.  Denies any hematological issues or bleeding disorders.  Today he started experiencing severe bleeding through his nostrils that prompted him to come to the emergency room.  Denies taking any anticoagulants but states that he took Excedrin migraine for pain control.    On arrival in the ER, he was noted to be tachycardic, afebrile.  CBC shows white count of over 15,000, probably reactive.  Hemoglobin 15.7 and normal platelets.  BMP with creatinine of 1.5 without previous known renal issues.    He was promptly evaluated by ENT at bedside in the ER, and Dr. Quiroz evaluated him first time with subsequent control of bleeding with Afrin, absorbable packing however shortly thereafter he had what appears to be a syncopal event (less likely seizure) and his packing came out.  ENT came by to see the patient again due to uncontrolled bleeding and a rigid endoscopy was performed, no active bleeding was noted, left-sided Surgicel was placed.  Antibiotics not  recommended if absorbable packing in place, but if he needs a Rhino Rocket future he may need antibiotics.    Due to recurrent nature of his bleeding, and syncope, admission was requested for further evaluation and treatment.    History reviewed. No pertinent past medical history.    History reviewed. No pertinent surgical history.    Review of patient's allergies indicates:  No Known Allergies    No current facility-administered medications on file prior to encounter.     No current outpatient medications on file prior to encounter.     Family History    None       Tobacco Use    Smoking status: Never    Smokeless tobacco: Never   Substance and Sexual Activity    Alcohol use: Yes     Comment:  3 times week    Drug use: Not on file    Sexual activity: Yes     Review of Systems  12 point systems were reviewed and negative except as mentioned in HPI section.  Objective:     Vital Signs (Most Recent):  Temp: 98.6 °F (37 °C) (02/29/24 2048)  Pulse: 92 (02/29/24 2048)  Resp: 20 (02/29/24 2048)  BP: 112/66 (02/29/24 2048)  SpO2: 98 % (02/29/24 2048) Vital Signs (24h Range):  Temp:  [98 °F (36.7 °C)-98.6 °F (37 °C)] 98.6 °F (37 °C)  Pulse:  [] 92  Resp:  [15-20] 20  SpO2:  [97 %-100 %] 98 %  BP: (102-158)/(57-99) 112/66     Weight: 86.2 kg (190 lb)  Body mass index is 28.89 kg/m².     Physical Exam     General-resting comfortably, alert and oriented, does not appear to be in any cardiorespiratory distress  Lungs clear to auscultation bilaterally  Heart regular rate and rhythm  Abdomen soft nontender nondistended  No peripheral edema        Significant Labs: All pertinent labs within the past 24 hours have been reviewed.  BMP:   Recent Labs   Lab 02/29/24  1510   *      K 3.5      CO2 20*   BUN 20   CREATININE 1.5*   CALCIUM 8.7     CBC:   Recent Labs   Lab 02/29/24  1330 02/29/24  1510   WBC 10.58 15.42*   HGB 17.3 15.7   HCT 49.5 44.8    426       Significant Imaging: I have reviewed all  pertinent imaging results/findings within the past 24 hours.  Assessment/Plan:     * Epistaxis  -post septoplasty 6 days ago.  -ENT evaluated the patient.  Follow further recommendations.  -Bleeding controlled after 2 interventions in the ER.  - avoid antiplatelets/anticoagulants.  -Monitor hemoglobin.      Acute kidney injury  -Continue IV fluids, likely prerenal.    Leukocytosis  -Likely reactive.  -No antibiotics currently indicated per ENT.      Syncope and collapse  -Happened after first nasal packing.  -Most likely vasovagal.  -Echocardiogram for completion.        VTE Risk Mitigation (From admission, onward)           Ordered     IP VTE LOW RISK PATIENT  Once         02/29/24 1949     Place sequential compression device  Until discontinued         02/29/24 1949                               AdmissionCare    Guideline: Head and Neck Disease, Inpatient    Based on the indications selected for the patient, the bed status of Inpatient was determined to be MET    The following indications were selected as present at the time of evaluation of the patient:      - Epistaxis that requires inpatient care, as indicated by 1 or more of the following:    - Inpatient monitoring needed (beyond observation care) due to high risk of severe recurrent bleeding (eg, irreversible overanticoagulation, bleeding diathesis)    - Posterior bleeding source    AdmissionCare documentation entered by: Asmita Monzon    [facility] [employer], 27th edition, Copyright © 2023 [facility] [employer] All Rights Reserved.  [id number]:00    Abiodun Torres MD  Department of Hospital Medicine  [facility]

## 2024-03-01 NOTE — SUBJECTIVE & OBJECTIVE
History reviewed. No pertinent past medical history.    History reviewed. No pertinent surgical history.    Review of patient's allergies indicates:  No Known Allergies    No current facility-administered medications on file prior to encounter.     No current outpatient medications on file prior to encounter.     Family History    None       Tobacco Use    Smoking status: Never    Smokeless tobacco: Never   Substance and Sexual Activity    Alcohol use: Yes     Comment:  3 times week    Drug use: Not on file    Sexual activity: Yes     Review of Systems  12 point systems were reviewed and negative except as mentioned in HPI section.  Objective:     Vital Signs (Most Recent):  Temp: 98.6 °F (37 °C) (02/29/24 2048)  Pulse: 92 (02/29/24 2048)  Resp: 20 (02/29/24 2048)  BP: 112/66 (02/29/24 2048)  SpO2: 98 % (02/29/24 2048) Vital Signs (24h Range):  Temp:  [98 °F (36.7 °C)-98.6 °F (37 °C)] 98.6 °F (37 °C)  Pulse:  [] 92  Resp:  [15-20] 20  SpO2:  [97 %-100 %] 98 %  BP: (102-158)/(57-99) 112/66     Weight: 86.2 kg (190 lb)  Body mass index is 28.89 kg/m².     Physical Exam     General-resting comfortably, alert and oriented, does not appear to be in any cardiorespiratory distress  Lungs clear to auscultation bilaterally  Heart regular rate and rhythm  Abdomen soft nontender nondistended  No peripheral edema        Significant Labs: All pertinent labs within the past 24 hours have been reviewed.  BMP:   Recent Labs   Lab 02/29/24  1510   *      K 3.5      CO2 20*   BUN 20   CREATININE 1.5*   CALCIUM 8.7     CBC:   Recent Labs   Lab 02/29/24  1330 02/29/24  1510   WBC 10.58 15.42*   HGB 17.3 15.7   HCT 49.5 44.8    426       Significant Imaging: I have reviewed all pertinent imaging results/findings within the past 24 hours.

## 2024-03-01 NOTE — SUBJECTIVE & OBJECTIVE
Interval History: no acute issues overnight, however this AM patient developed significant nose bleed with no improvement  after afrin sprays. Dr. Quiroz to bedside and placed bilateral nose packing. Started on antibiotics.     Review of Systems  Objective:     Vital Signs (Most Recent):  Temp: 98.8 °F (37.1 °C) (03/01/24 1146)  Pulse: 101 (03/01/24 1459)  Resp: 18 (03/01/24 1146)  BP: (!) 163/91 (03/01/24 1146)  SpO2: 100 % (03/01/24 1146) Vital Signs (24h Range):  Temp:  [98.5 °F (36.9 °C)-98.8 °F (37.1 °C)] 98.8 °F (37.1 °C)  Pulse:  [] 101  Resp:  [15-20] 18  SpO2:  [97 %-100 %] 100 %  BP: (102-163)/(57-91) 163/91     Weight: 104.6 kg (230 lb 9.6 oz)  Body mass index is 34.05 kg/m².    Intake/Output Summary (Last 24 hours) at 3/1/2024 1503  Last data filed at 3/1/2024 0617  Gross per 24 hour   Intake 1263.04 ml   Output 1000 ml   Net 263.04 ml         Physical Exam  Vitals and nursing note reviewed.   Constitutional:       General: He is not in acute distress.     Appearance: He is ill-appearing.   HENT:      Head:      Comments: Ongoing nose bleeding, now with bilateral nasal packing  Cardiovascular:      Rate and Rhythm: Normal rate and regular rhythm.      Pulses: Normal pulses.   Pulmonary:      Effort: Pulmonary effort is normal.   Abdominal:      General: There is no distension.   Musculoskeletal:         General: No swelling.   Skin:     General: Skin is warm.   Neurological:      General: No focal deficit present.      Mental Status: He is alert.   Psychiatric:         Mood and Affect: Mood normal.         Thought Content: Thought content normal.             Significant Labs: All pertinent labs within the past 24 hours have been reviewed.    Significant Imaging: I have reviewed all pertinent imaging results/findings within the past 24 hours.

## 2024-03-01 NOTE — HPI
Mr Maza is a pleasant 48-year-old male who presents with epistaxis.  Denies any medical conditions, does not take any scheduled medications, reports that he had surgery for deviated septum on Friday, 2/23/2024, and was doing well postoperatively, did not have any packing or splints after surgery.  Denies any prior other head and neck surgeries.  Denies any hematological issues or bleeding disorders.  Today he started experiencing severe bleeding through his nostrils that prompted him to come to the emergency room.  Denies taking any anticoagulants but states that he took Excedrin migraine for pain control.    On arrival in the ER, he was noted to be tachycardic, afebrile.  CBC shows white count of over 15,000, probably reactive.  Hemoglobin 15.7 and normal platelets.  BMP with creatinine of 1.5 without previous known renal issues.    He was promptly evaluated by ENT at bedside in the ER, and Dr. Quiroz evaluated him first time with subsequent control of bleeding with Afrin, absorbable packing however shortly thereafter he had what appears to be a syncopal event (less likely seizure) and his packing came out.  ENT came by to see the patient again due to uncontrolled bleeding and a rigid endoscopy was performed, no active bleeding was noted, left-sided Surgicel was placed.  Antibiotics not recommended if absorbable packing in place, but if he needs a Rhino Rocket future he may need antibiotics.    Due to recurrent nature of his bleeding, and syncope, admission was requested for further evaluation and treatment.

## 2024-03-01 NOTE — PROGRESS NOTES
[facility]  Otorhinolaryngology-Head & Neck Surgery  Progress Note    Patient Name: Mark Maza  MRN: [id number]  Code Status: Full Code  Admission Date: 2/29/2024  Hospital Length of Stay: 1 days  Attending Physician: Octavio Glynn MD  Primary Care Provider: Francisca, Primary Doctor    Subjective:     Interval History: no dizziness. Having dark stools . No significant bleeding overnight however this am started having another severe bleed out of both sides of nose.     Post-Op Info:  * No surgery found *      Hospital Day: 2      Medications:  Continuous Infusions:   lactated ringers 100 mL/hr at 03/01/24 0713     Scheduled Meds:   amoxicillin-clavulanate 875-125mg  1 tablet Oral Q12H    ondansetron  4 mg Intravenous Once     PRN Meds:acetaminophen, aluminum-magnesium hydroxide-simethicone, dextrose 10%, dextrose 10%, glucagon (human recombinant), glucose, glucose, melatonin, morphine, naloxone, ondansetron, sodium chloride 0.9%     Objective:     Vital Signs (24h Range):  Temp:  [98 °F (36.7 °C)-98.8 °F (37.1 °C)] 98.8 °F (37.1 °C)  Pulse:  [] 94  Resp:  [15-20] 18  SpO2:  [97 %-100 %] 100 %  BP: (102-163)/(57-99) 163/91       Lines/Drains/Airways       Peripheral Intravenous Line  Duration                  Peripheral IV - Single Lumen 02/29/24 1429 20 G Left Antecubital <1 day                    Physical Exam  Vitals reviewed.   HENT:      Nose:      Right Nostril: Epistaxis present.      Left Nostril: Epistaxis present.      Comments: See procedure note      Mouth/Throat:      Comments: Blood in posterior oropharynx , nosebleed from bilateral nares and spitting out copious amount of blood and clots  Neurological:      Mental Status: He is alert.         PROCEDURE NOTE  Procedure performed: control of epistaxis, posterior   Indications for procedure: persistent severe bleeding around anterior nasal packing   Verbal consent obtained after explanation of indications, pros cons and alternatives to  packing. Patient agreed  Procedure in detail: the bilateral nares were suctioned with large straight nasal suction to remove absorbable packing and clots and active blood coming from both sides. A tee merocel 10 cm packed wrapped in surgicel was inserted into the left nasal cavity with bayonette forceps. The same procedure was done for the right nasal cavity. Patient tolerated procedure well. There were no complications        Repeat exam performed at around 17:15  Clot between septum and packing on right side. No active bleeding. Packing in left nare without over clots around it and no active bleeding  No blood in posterior oropharynx  Moustache dressing in place   Significant Labs:  CBC:   Recent Labs   Lab 03/01/24  0419   WBC 9.70   RBC 3.99*   HGB 12.2*   HCT 36.2*      MCV 91   MCH 30.6   MCHC 33.7     CMP:   Recent Labs   Lab 02/29/24  1330 02/29/24  1510 03/01/24  0419      < > 99   CALCIUM 9.4   < > 8.2*   ALBUMIN 4.1  --   --    PROT 8.2  --   --       < > 137   K 4.3   < > 4.0   CO2 26   < > 22*      < > 108   BUN 16   < > 29*   CREATININE 1.4   < > 1.1   ALKPHOS 81  --   --    ALT 34  --   --    AST 22  --   --    BILITOT 0.4  --   --     < > = values in this interval not displayed.       Significant Diagnostics:  none    Assessment/Plan:     Active Diagnoses:    Diagnosis Date Noted POA    PRINCIPAL PROBLEM:  Epistaxis [R04.0] 02/29/2024 Yes    Acute kidney injury [N17.9] 02/29/2024 Yes    Syncope and collapse [R55] 02/29/2024 Yes    Leukocytosis [D72.829] 02/29/2024 Yes      Problems Resolved During this Admission:     Has failed two attempts at conservative measures of absorbable packing material. Rebled this am around the absorbable packing therefore decision made to pack bilaterally with tee merocels wrapped in surgicel ( see above)    Afrin prn     Saline 1-2 times per day to moisten packs    Moustache dressing- change prn saturation , no manipulation of nose    May have  clots fall off back of packs and/or blood tinged drainage from saline/prior afrin that comes out - this should slow down over a few days     If rebleeds can add additional surgicel around the pack , squirt afrin and hold pressure for 15 minutes but if continues would recommend transfer to main [place] for evaluation for possible IR embolization. No arterial bleeding noted on rigid endoscopy yesterday just had some areas of slight mucosal oozing however when he does bleed it is quite brisk however no obvious source to cauterize . If venous oozing, should stop with packs in place but would be concerned about potential arterial bleeding if has bad bleed again with both nares packed    Recommend not removing packs if rebleeds as this will cause more mucosal shearing    If does ok over the weekend, will plan to remove packing on right side Monday am and leave packing in left side for an additional couple of days but could potentially go home with unilateral packing in. Needs inpatient stay while bilateral packs in place due to potential for bradycardic reflex that can occur     Recommend anti staph prophylaxis while packs in place    Spoke with patient's surgeon dr rosales. Have also communicated with dr kevin. Discussed next steps and any concerns with patient and his wife    No ent at [facility] over the weekend. Will follow up with patient Monday . If needs emergent intervention would need transfer to [facility] over the weekend   Davina Quiroz MD  Otorhinolaryngology-Head & Neck Surgery  [facility] - Med Surg

## 2024-03-01 NOTE — PLAN OF CARE
Case Management Assessment     PCP: Dr. Manjinder Roberto    Pharmacy:   [id number] - [place] - [address]  [address] [address]  Phone: [phone] Fax: [phone]        Patient Arrived From: Home  Existing Help at Home: wife    Barriers to Discharge: no barriers    Discharge Plan:    A. Home   B. Home       03/01/24 1526   Discharge Assessment   Assessment Type Discharge Planning Assessment   Confirmed/corrected address, phone number and insurance Yes   Confirmed Demographics Correct on Facesheet   Source of Information patient;health record   People in Home spouse   Do you expect to return to your current living situation? Yes   Do you have help at home or someone to help you manage your care at home? Yes   Who are your caregiver(s) and their phone number(s)? Del Camacho (Spouse) [phone] (Home Phone)   Prior to hospitilization cognitive status: Alert/Oriented   Current cognitive status: Alert/Oriented   Equipment Currently Used at Home none   Readmission within 30 days? No   Patient currently being followed by outpatient case management? No   Do you currently have service(s) that help you manage your care at home? No   Do you take prescription medications? Yes   Do you have prescription coverage? Yes   Coverage [employer]   Do you have any problems affording any of your prescribed medications? No   Is the patient taking medications as prescribed? yes   Who is going to help you get home at discharge? Del Camacho (Spouse) [phone] (Home Phone)   How do you get to doctors appointments? car, drives self   Are you on dialysis? No   Do you take coumadin? No   Discharge Plan A Home   Discharge Plan B Home   DME Needed Upon Discharge  none   Discharge Plan discussed with: Patient   Transition of Care Barriers None

## 2024-03-01 NOTE — NURSING
[facility] [place]  Nurses Note -- 4 Eyes      3/1/2024       Skin assessed on: Q Shift      [x] No Pressure Injuries Present    []Prevention Measures Documented    [] Yes LDA  for Pressure Injury Previously documented     [] Yes New Pressure Injury Discovered   [] LDA for New Pressure Injury Added      Attending RN:  Ibis Faith RN     Second RN:  EDILMA Rubin

## 2024-03-01 NOTE — NURSING
Dr. Glynn and Dr. Quiroz are at the bedside. Bleeding has been controlled by ENT. Pt stable. Plan of care ongoing.

## 2024-03-01 NOTE — ASSESSMENT & PLAN NOTE
-Happened after first nasal packing.  -Most likely vasovagal.  -Echocardiogram for completion. Normal EF

## 2024-03-01 NOTE — ASSESSMENT & PLAN NOTE
-post septoplasty 6 days ago.  -ENT evaluated the patient.  Follow further recommendations.  -Bleeding controlled after 2 interventions in the ER.  - avoid antiplatelets/anticoagulants.  -Monitor hemoglobin.

## 2024-03-01 NOTE — PLAN OF CARE
Problem: Adult Inpatient Plan of Care  Goal: Plan of Care Review  Outcome: Ongoing, Progressing  Flowsheets (Taken 3/1/2024 1504)  Plan of Care Reviewed With:   patient   spouse  Goal: Absence of Hospital-Acquired Illness or Injury  Outcome: Ongoing, Progressing  Intervention: Identify and Manage Fall Risk  Flowsheets (Taken 3/1/2024 1504)  Safety Promotion/Fall Prevention:   assistive device/personal item within reach   side rails raised x 2   room near unit station  Goal: Optimal Comfort and Wellbeing  Outcome: Ongoing, Progressing  Intervention: Monitor Pain and Promote Comfort  Flowsheets (Taken 3/1/2024 1504)  Pain Management Interventions:   care clustered   pain management plan reviewed with patient/caregiver  Intervention: Provide Person-Centered Care  Flowsheets (Taken 3/1/2024 1504)  Trust Relationship/Rapport:   care explained   choices provided   emotional support provided   empathic listening provided   questions answered   questions encouraged   reassurance provided   thoughts/feelings acknowledged

## 2024-03-01 NOTE — CONSULTS
[facility] - Emergency Dept  Otorhinolaryngology-Head & Neck Surgery  Consult Note    Patient Name: Mark Maza  MRN: [id number]  Code Status: No Order  Admission Date: 2/29/2024  Hospital Length of Stay: 0 days  Attending Physician: John Hyde III, MD  Primary Care Provider: Francisca, Primary Doctor    Consults  Patient seen and examined twice today  Rigid endoscopy performed at second assessment as had blown nose and absorbable packing came out but not actively bleeding and able to evaluate source of bleeding better at that time. On initial exam earlier in the day, appeared to be from left side - large clot on that side and anterior rhinoscopy without obvious source but did appear to have more clots and blood in left side and also where patient noted initial bleed ( although did quickly progress to bilateral)    Placed surgicel left nare- counseled as dissolves will be brown and smell    Does not need anti staph prophylaxis if absorbable packing in place but would need if rhinorocket or tee merocel is required    If bleeds overnight, spray afrin -2-3 large sprays in each nostril and pinch nostrils together against septum/middle of nose. Hold for 15 minutes and don't let go. If still bleeding than repeat afrin and pressure and if still bleeding would need additional packing- left surgicel at bedside can place in opposite nare if starts having issue- no obvious right sided problem on endoscopy this afternoon - septoplasty approach is also on the left side so suspect this would be more risk to be source but possible for right to bleed as well    If unable to stop with additional surgicel recommend remove surgicel and place rhinorocket or tee merocel pack     Discussed with him that it is ok if clots fall off the back of the packing or his nose- this is expected to happen and will improve over a few days. Would need additional intervention if bright red bleeding coming from the front of nose. May have slight trickle  of blood down the back of throat or out of the front of nose ; recommend avoiding manipulation in this scenario because often just needs more time and manipulation can cause further mucosal trauma and perpetuate bleeding. Can place 4X4 gauze under nose to collect any drainage ( tape on upper lip as a moustache dressing)    No coagulopathy on labs, no evidence of anemia    Will check on patient tomorrow as well, call for question/concern  Subjective:     Chief Complaint/Reason for Admission: epistaxis    History of Present Illness: 37 yo [facility]. Did not have any packing or splints in . Denies having additional procedures such as turbinate reduction or sinus surgery. Had been doing well but started bleeding this afternoon and bled for about an hour prior to presenting to er. Started on left and quickly became bilateral. Has swallowed a lot of blood . He said [facility] but unclear what. Had bleeding from eye as well when bleeding started.     Denies history of easy bleeding/bruising.     Medications:  Continuous Infusions:  Scheduled Meds:  PRN Meds:     No current facility-administered medications on file prior to encounter.     No current outpatient medications on file prior to encounter.       Review of patient's allergies indicates:  No Known Allergies    History reviewed. No pertinent past medical history.  History reviewed. No pertinent surgical history.  Family History    None       Tobacco Use    Smoking status: Never    Smokeless tobacco: Never   Substance and Sexual Activity    Alcohol use: Yes     Comment:  3 times week    Drug use: Not on file    Sexual activity: Yes     Review of Systems   Constitutional:  Negative for fever.   HENT:  Positive for nosebleeds.    Respiratory:  Negative for stridor.    Cardiovascular:  Negative for chest pain.   Gastrointestinal:  Negative for blood in stool.   Neurological:  Positive  for dizziness (slight lightheadedness-had vasovagal like episode in er). Negative for headaches.   Hematological:  Does not bruise/bleed easily.     Objective:     Vital Signs (Most Recent):  Temp: 98 °F (36.7 °C) (02/29/24 1333)  Pulse: 103 (02/29/24 1708)  Resp: 19 (02/29/24 1708)  BP: 105/63 (02/29/24 1702)  SpO2: 98 % (02/29/24 1708) Vital Signs (24h Range):  Temp:  [98 °F (36.7 °C)] 98 °F (36.7 °C)  Pulse:  [] 103  Resp:  [15-20] 19  SpO2:  [97 %-100 %] 98 %  BP: (102-158)/(57-99) 105/63     Weight: 86.2 kg (190 lb)  Body mass index is 28.89 kg/m².      Initial exam at around 13:45  Physical Exam  HENT:      Head: Normocephalic.      Nose: Mucosal edema present. No septal deviation or laceration.      Comments: Large clots hanging from nares bilaterally. With slight bleeding from left side around the clot. Suctioned clot and exam done - no active bleeding on right, some active bleeding on left but not brisk. Nasopore wrapped in surgicel placed in left nasal cavity. Repeat exam of oropharynx after this - no further bleeding or clots at that time.      Mouth/Throat:      Mouth: Mucous membranes are dry.   Eyes:      Comments: No bleeding or discharge from eyes   Neck:      Trachea: Trachea normal.   Pulmonary:      Effort: Pulmonary effort is normal.      Breath sounds: No stridor.   Neurological:      Mental Status: He is alert.       PROCEDURE NOTE  Procedure: control of nosebleed, simple  Indications for procedure: epistaxis not controlled with pressure and afrin spray  Procedure in detail: With the patient in the seated position, nasal speculum used to evaluate the nose, a rigid suction was used to suction clot from bilateral nares. Bleeding noted from left side. Absorbable packing ( as described in above hpi) placed in left nare with bayonette forceps. Afrin spray on packing.   Patient tolerated procedure well       Second exam at around 17:10  GENERAL: alert, NAD  EYES: no scleral icterus, no  bleeding  NOSE: no active bleeding- see scope exam  ORAL CAVITY: no blood in posterior oropharynx  RESPIRATORY: no stridor nor stertor    PROCEDURE NOTE  Procedure: diagnostic rigid nasal endoscopy  Indications for procedure: vasovagal syncope ; evaluate for source of bleeding and potential need for additional intervention given severity of bleed on prior exam and concern for posterior source      Consent: procedure was explained in detail and verbal consent was obtained.   Anesthesia:4% lidocaine with neosynephrine  Procedure in detail: With the patient in the seated position, the zero degree endoscope was inserted atraumatically into the bilateral nasal cavities and advance to the nasopharynx with the following areas examined with findings as described below.     Nasal cavity:no polyps or mass, no purulent drainage, no active bleeding  Septum: no perforation ; midline, suture caudal left septum. Small clot along caudal septum inferiorly extending to mid nasal cavity. Right caudal septum with no enlarged vessels or clots.   Turbinates:  inferior turbinate with mild-moderate hypertrophy ; middle turbinates - right with streak of blood , no bleeding/clots /enlarged vessels of left MT  [place]: no edema  Nasopharynx: no mass or lesion in the nasopharynx.   No bleeding from sphenopalatine artery region   The scope was removed atraumatically without complication. The patient tolerated the procedure well.       Significant Labs:  CBC:   Recent Labs   Lab 02/29/24  1510   WBC 15.42*   RBC 5.16   HGB 15.7   HCT 44.8      MCV 87   MCH 30.4   MCHC 35.0     CMP:   Recent Labs   Lab 02/29/24  1330 02/29/24  1510    136*   CALCIUM 9.4 8.7   ALBUMIN 4.1  --    PROT 8.2  --     137   K 4.3 3.5   CO2 26 20*    107   BUN 16 20   CREATININE 1.4 1.5*   ALKPHOS 81  --    ALT 34  --    AST 22  --    BILITOT 0.4  --      Coagulation:   Recent Labs   Lab 02/29/24  1330   LABPROT 11.0   INR 1.0   APTT 29.5        Significant Diagnostics:  No imaging    Assessment/Plan:   epistaxis    VTE Risk Mitigation (From admission, onward)      None            Thank you for your consult. I will follow-up with patient. Please contact us if you have any additional questions.    Davina Quiroz MD  Otorhinolaryngology-Head & Neck Surgery  [facility] - Emergency Dept

## 2024-03-01 NOTE — PLAN OF CARE
No active bleeding from nasal packing. Continue to monitor  Problem: Adult Inpatient Plan of Care  Goal: Plan of Care Review  Outcome: Ongoing, Progressing  Goal: Patient-Specific Goal (Individualized)  Outcome: Ongoing, Progressing  Goal: Optimal Comfort and Wellbeing  Outcome: Ongoing, Progressing     Problem: Oral Intake Inadequate (Acute Kidney Injury/Impairment)  Goal: Optimal Nutrition Intake  Outcome: Ongoing, Progressing     Problem: Renal Function Impairment (Acute Kidney Injury/Impairment)  Goal: Effective Renal Function  Outcome: Ongoing, Progressing     Problem: Fall Injury Risk  Goal: Absence of Fall and Fall-Related Injury  3/1/2024 0613 by Caryn Palmer, RN  Outcome: Ongoing, Progressing  3/1/2024 0613 by Caryn Palmer, RN  Outcome: Ongoing, Not Progressing     Problem: Pain Acute  Goal: Acceptable Pain Control and Functional Ability  Outcome: Ongoing, Progressing

## 2024-03-01 NOTE — NURSING
Pt bleeding copious amount of blood from nose and mouth. Dr. Quiroz and Dr. Glynn notified and are on the way to assess pt. Pressure applied to both nostrils. Pt is calm and stable at this time.

## 2024-03-01 NOTE — NURSING
[facility], [facility] [place]  Nurses Note -- 4 Eyes      2-29-24      Skin assessed on: Admit      [x] No Pressure Injuries Present    []Prevention Measures Documented    [] Yes LDA  for Pressure Injury Previously documented     [] Yes New Pressure Injury Discovered   [] LDA for New Pressure Injury Added      Attending RN:  Caryn Palmer RN     Second RN:  Farnaz Dodson RN

## 2024-03-02 LAB
ANION GAP SERPL CALC-SCNC: 3 MMOL/L (ref 8–16)
BASOPHILS # BLD AUTO: 0.02 K/UL (ref 0–0.2)
BASOPHILS NFR BLD: 0.2 % (ref 0–1.9)
BUN SERPL-MCNC: 13 MG/DL (ref 6–20)
CALCIUM SERPL-MCNC: 8.4 MG/DL (ref 8.7–10.5)
CHLORIDE SERPL-SCNC: 104 MMOL/L (ref 95–110)
CO2 SERPL-SCNC: 28 MMOL/L (ref 23–29)
CREAT SERPL-MCNC: 0.9 MG/DL (ref 0.5–1.4)
DIFFERENTIAL METHOD BLD: ABNORMAL
EOSINOPHIL # BLD AUTO: 0.1 K/UL (ref 0–0.5)
EOSINOPHIL NFR BLD: 0.6 % (ref 0–8)
ERYTHROCYTE [DISTWIDTH] IN BLOOD BY AUTOMATED COUNT: 12.9 % (ref 11.5–14.5)
EST. GFR  (NO RACE VARIABLE): >60 ML/MIN/1.73 M^2
GLUCOSE SERPL-MCNC: 112 MG/DL (ref 70–110)
HCT VFR BLD AUTO: 32.7 % (ref 40–54)
HGB BLD-MCNC: 11.1 G/DL (ref 14–18)
IMM GRANULOCYTES # BLD AUTO: 0.1 K/UL (ref 0–0.04)
IMM GRANULOCYTES NFR BLD AUTO: 0.8 % (ref 0–0.5)
LYMPHOCYTES # BLD AUTO: 2.3 K/UL (ref 1–4.8)
LYMPHOCYTES NFR BLD: 18.1 % (ref 18–48)
MCH RBC QN AUTO: 30.2 PG (ref 27–31)
MCHC RBC AUTO-ENTMCNC: 33.9 G/DL (ref 32–36)
MCV RBC AUTO: 89 FL (ref 82–98)
MONOCYTES # BLD AUTO: 1.4 K/UL (ref 0.3–1)
MONOCYTES NFR BLD: 11.2 % (ref 4–15)
NEUTROPHILS # BLD AUTO: 8.8 K/UL (ref 1.8–7.7)
NEUTROPHILS NFR BLD: 69.1 % (ref 38–73)
NRBC BLD-RTO: 0 /100 WBC
OHS QRS DURATION: 86 MS
OHS QTC CALCULATION: 408 MS
PLATELET # BLD AUTO: 300 K/UL (ref 150–450)
PMV BLD AUTO: 9.1 FL (ref 9.2–12.9)
POTASSIUM SERPL-SCNC: 3.6 MMOL/L (ref 3.5–5.1)
RBC # BLD AUTO: 3.68 M/UL (ref 4.6–6.2)
SODIUM SERPL-SCNC: 135 MMOL/L (ref 136–145)
WBC # BLD AUTO: 12.71 K/UL (ref 3.9–12.7)

## 2024-03-02 PROCEDURE — 11000001 HC ACUTE MED/SURG PRIVATE ROOM

## 2024-03-02 PROCEDURE — 85025 COMPLETE CBC W/AUTO DIFF WBC: CPT | Performed by: INTERNAL MEDICINE

## 2024-03-02 PROCEDURE — 80048 BASIC METABOLIC PNL TOTAL CA: CPT | Performed by: INTERNAL MEDICINE

## 2024-03-02 PROCEDURE — 63600175 PHARM REV CODE 636 W HCPCS: Performed by: INTERNAL MEDICINE

## 2024-03-02 PROCEDURE — 63600175 PHARM REV CODE 636 W HCPCS: Mod: JZ,JG | Performed by: STUDENT IN AN ORGANIZED HEALTH CARE EDUCATION/TRAINING PROGRAM

## 2024-03-02 PROCEDURE — 36415 COLL VENOUS BLD VENIPUNCTURE: CPT | Performed by: INTERNAL MEDICINE

## 2024-03-02 PROCEDURE — 25000003 PHARM REV CODE 250: Performed by: STUDENT IN AN ORGANIZED HEALTH CARE EDUCATION/TRAINING PROGRAM

## 2024-03-02 RX ORDER — OXYCODONE AND ACETAMINOPHEN 7.5; 325 MG/1; MG/1
1 TABLET ORAL EVERY 4 HOURS PRN
Status: DISCONTINUED | OUTPATIENT
Start: 2024-03-02 | End: 2024-03-04 | Stop reason: HOSPADM

## 2024-03-02 RX ORDER — MORPHINE SULFATE 4 MG/ML
4 INJECTION, SOLUTION INTRAMUSCULAR; INTRAVENOUS EVERY 4 HOURS PRN
Status: DISCONTINUED | OUTPATIENT
Start: 2024-03-02 | End: 2024-03-04 | Stop reason: HOSPADM

## 2024-03-02 RX ADMIN — SODIUM CHLORIDE, POTASSIUM CHLORIDE, SODIUM LACTATE AND CALCIUM CHLORIDE: 600; 310; 30; 20 INJECTION, SOLUTION INTRAVENOUS at 04:03

## 2024-03-02 RX ADMIN — MORPHINE SULFATE 4 MG: 4 INJECTION, SOLUTION INTRAMUSCULAR; INTRAVENOUS at 11:03

## 2024-03-02 RX ADMIN — AMOXICILLIN AND CLAVULANATE POTASSIUM 1 TABLET: 875; 125 TABLET, FILM COATED ORAL at 09:03

## 2024-03-02 RX ADMIN — SODIUM CHLORIDE, POTASSIUM CHLORIDE, SODIUM LACTATE AND CALCIUM CHLORIDE: 600; 310; 30; 20 INJECTION, SOLUTION INTRAVENOUS at 12:03

## 2024-03-02 RX ADMIN — AMOXICILLIN AND CLAVULANATE POTASSIUM 1 TABLET: 875; 125 TABLET, FILM COATED ORAL at 08:03

## 2024-03-02 RX ADMIN — MORPHINE SULFATE 4 MG: 4 INJECTION, SOLUTION INTRAMUSCULAR; INTRAVENOUS at 06:03

## 2024-03-02 RX ADMIN — MORPHINE SULFATE 4 MG: 4 INJECTION, SOLUTION INTRAMUSCULAR; INTRAVENOUS at 12:03

## 2024-03-02 RX ADMIN — MORPHINE SULFATE 4 MG: 4 INJECTION, SOLUTION INTRAMUSCULAR; INTRAVENOUS at 08:03

## 2024-03-02 RX ADMIN — MORPHINE SULFATE 4 MG: 4 INJECTION, SOLUTION INTRAMUSCULAR; INTRAVENOUS at 04:03

## 2024-03-02 RX ADMIN — OXYCODONE AND ACETAMINOPHEN 1 TABLET: 7.5; 325 TABLET ORAL at 02:03

## 2024-03-02 RX ADMIN — OXYCODONE AND ACETAMINOPHEN 1 TABLET: 7.5; 325 TABLET ORAL at 07:03

## 2024-03-02 NOTE — ASSESSMENT & PLAN NOTE
-post septoplasty 6 days ago.  -ENT evaluated the patient.  Follow further recommendations.  -Bleeding controlled after 2 interventions in the ER.  - Repeat bleeding on morning of 3/1 requiring bilateral nasal packing by ENT. These will need to stay in place over the weekend and plan to monitor patient. Started on augmentin for staph aureus coverage. Discussed with Dr. Quiroz.  -Monitor hemoglobin.

## 2024-03-02 NOTE — SUBJECTIVE & OBJECTIVE
Interval History: no acute issues, still with packing in place and no rebleed thus far. Still having some pain    Review of Systems  Objective:     Vital Signs (Most Recent):  Temp: 99 °F (37.2 °C) (03/02/24 1152)  Pulse: 91 (03/02/24 1152)  Resp: 18 (03/02/24 1247)  BP: (!) 168/91 (03/02/24 1152)  SpO2: 98 % (03/02/24 1152) Vital Signs (24h Range):  Temp:  [98 °F (36.7 °C)-99.2 °F (37.3 °C)] 99 °F (37.2 °C)  Pulse:  [] 91  Resp:  [17-20] 18  SpO2:  [98 %-100 %] 98 %  BP: (139-168)/(81-94) 168/91     Weight: 104.6 kg (230 lb 9.6 oz)  Body mass index is 34.05 kg/m².    Intake/Output Summary (Last 24 hours) at 3/2/2024 1435  Last data filed at 3/2/2024 0400  Gross per 24 hour   Intake 240 ml   Output 350 ml   Net -110 ml           Physical Exam  Vitals and nursing note reviewed.   Constitutional:       General: He is not in acute distress.     Appearance: He is ill-appearing.   HENT:      Head:      Comments: Bilateral nasal packing in place with outside gauze bandages intact  Cardiovascular:      Rate and Rhythm: Normal rate and regular rhythm.      Pulses: Normal pulses.   Pulmonary:      Effort: Pulmonary effort is normal.   Abdominal:      General: There is no distension.   Musculoskeletal:         General: No swelling.   Skin:     General: Skin is warm.   Neurological:      General: No focal deficit present.      Mental Status: He is alert.   Psychiatric:         Mood and Affect: Mood normal.         Thought Content: Thought content normal.             Significant Labs: All pertinent labs within the past 24 hours have been reviewed.    Significant Imaging: I have reviewed all pertinent imaging results/findings within the past 24 hours.

## 2024-03-02 NOTE — NURSING
[facility], [facility]  Nurses Note -- 4 Eyes      3/2/2024       Skin assessed on: Q Shift      [x] No Pressure Injuries Present    []Prevention Measures Documented    [] Yes LDA  for Pressure Injury Previously documented     [] Yes New Pressure Injury Discovered   [] LDA for New Pressure Injury Added      Attending RN:  Winsome Velásquez RN     Second RN:  Farnaz

## 2024-03-02 NOTE — PROGRESS NOTES
[facility] Medicine  Progress Note    Patient Name: Mark Maza  MRN: [id number]  Patient Class: IP- Inpatient   Admission Date: 2/29/2024  Length of Stay: 2 days  Attending Physician: Octavio Glynn MD  Primary Care Provider: Francisca, Primary Doctor        Subjective:     Principal Problem:Epistaxis        HPI:  Mr Maza is a pleasant 48-year-old male who presents with epistaxis.  Denies any medical conditions, does not take any scheduled medications, reports that he had surgery for deviated septum on Friday, 2/23/2024, and was doing well postoperatively, did not have any packing or splints after surgery.  Denies any prior other head and neck surgeries.  Denies any hematological issues or bleeding disorders.  Today he started experiencing severe bleeding through his nostrils that prompted him to come to the emergency room.  Denies taking any anticoagulants but states that he took Excedrin migraine for pain control.    On arrival in the ER, he was noted to be tachycardic, afebrile.  CBC shows white count of over 15,000, probably reactive.  Hemoglobin 15.7 and normal platelets.  BMP with creatinine of 1.5 without previous known renal issues.    He was promptly evaluated by ENT at bedside in the ER, and Dr. Quiroz evaluated him first time with subsequent control of bleeding with Afrin, absorbable packing however shortly thereafter he had what appears to be a syncopal event (less likely seizure) and his packing came out.  ENT came by to see the patient again due to uncontrolled bleeding and a rigid endoscopy was performed, no active bleeding was noted, left-sided Surgicel was placed.  Antibiotics not recommended if absorbable packing in place, but if he needs a Rhino Rocket future he may need antibiotics.    Due to recurrent nature of his bleeding, and syncope, admission was requested for further evaluation and treatment.    Overview/Hospital Course:  No notes on file    Interval History: no  acute issues, still with packing in place and no rebleed thus far. Still having some pain    Review of Systems  Objective:     Vital Signs (Most Recent):  Temp: 99 °F (37.2 °C) (03/02/24 1152)  Pulse: 91 (03/02/24 1152)  Resp: 18 (03/02/24 1247)  BP: (!) 168/91 (03/02/24 1152)  SpO2: 98 % (03/02/24 1152) Vital Signs (24h Range):  Temp:  [98 °F (36.7 °C)-99.2 °F (37.3 °C)] 99 °F (37.2 °C)  Pulse:  [] 91  Resp:  [17-20] 18  SpO2:  [98 %-100 %] 98 %  BP: (139-168)/(81-94) 168/91     Weight: 104.6 kg (230 lb 9.6 oz)  Body mass index is 34.05 kg/m².    Intake/Output Summary (Last 24 hours) at 3/2/2024 1435  Last data filed at 3/2/2024 0400  Gross per 24 hour   Intake 240 ml   Output 350 ml   Net -110 ml           Physical Exam  Vitals and nursing note reviewed.   Constitutional:       General: He is not in acute distress.     Appearance: He is ill-appearing.   HENT:      Head:      Comments: Bilateral nasal packing in place with outside gauze bandages intact  Cardiovascular:      Rate and Rhythm: Normal rate and regular rhythm.      Pulses: Normal pulses.   Pulmonary:      Effort: Pulmonary effort is normal.   Abdominal:      General: There is no distension.   Musculoskeletal:         General: No swelling.   Skin:     General: Skin is warm.   Neurological:      General: No focal deficit present.      Mental Status: He is alert.   Psychiatric:         Mood and Affect: Mood normal.         Thought Content: Thought content normal.             Significant Labs: All pertinent labs within the past 24 hours have been reviewed.    Significant Imaging: I have reviewed all pertinent imaging results/findings within the past 24 hours.    Assessment/Plan:      * Epistaxis  -post septoplasty 6 days ago.  -ENT evaluated the patient.  Follow further recommendations.  -Bleeding controlled after 2 interventions in the ER.  - Repeat bleeding on morning of 3/1 requiring bilateral nasal packing by ENT. These will need to stay in place  over the weekend and plan to monitor patient. Started on augmentin for staph aureus coverage. Discussed with Dr. Quiroz.  -Monitor hemoglobin.      Leukocytosis  -Likely reactive.  -resolved        Syncope and collapse  -Happened after first nasal packing.  -Most likely vasovagal.  -Echocardiogram for completion. Normal EF      Acute kidney injury  -Continue IV fluids, likely prerenal.  - resolved      VTE Risk Mitigation (From admission, onward)           Ordered     IP VTE LOW RISK PATIENT  Once         02/29/24 1949     Place sequential compression device  Until discontinued         02/29/24 1949                    Discharge Planning   ELISEO:      Code Status: Full Code   Is the patient medically ready for discharge?:     Reason for patient still in hospital (select all that apply): Treatment  Discharge Plan A: Home                  Octavio Glynn MD  Department of Hospital Medicine   [facility] Surg

## 2024-03-02 NOTE — NURSING
[facility], [facility] [place]  Nurses Note -- 4 Eyes      3/1/2024       Skin assessed on: Q Shift      [x] No Pressure Injuries Present    [x]Prevention Measures Documented    [] Yes LDA  for Pressure Injury Previously documented     [] Yes New Pressure Injury Discovered   [] LDA for New Pressure Injury Added      Attending RN:  Farnaz Farfan RN     Second RN:  EDILMA Baumann

## 2024-03-02 NOTE — PLAN OF CARE
Problem: Adult Inpatient Plan of Care  Goal: Plan of Care Review  Outcome: Ongoing, Progressing  Flowsheets (Taken 3/2/2024 0815)  Plan of Care Reviewed With: patient  Goal: Patient-Specific Goal (Individualized)  Outcome: Ongoing, Progressing     Problem: Pain Acute  Goal: Acceptable Pain Control and Functional Ability  Outcome: Ongoing, Progressing  Intervention: Develop Pain Management Plan  Flowsheets (Taken 3/2/2024 0815)  Pain Management Interventions:   around-the-clock dosing utilized   awakened for pain meds per patient request   care clustered   quiet environment facilitated     Problem: Adult Inpatient Plan of Care  Goal: Plan of Care Review  Outcome: Ongoing, Progressing  Flowsheets (Taken 3/2/2024 0815)  Plan of Care Reviewed With: patient     Problem: Adult Inpatient Plan of Care  Goal: Plan of Care Review  Outcome: Ongoing, Progressing  Flowsheets (Taken 3/2/2024 0815)  Plan of Care Reviewed With: patient     Problem: Adult Inpatient Plan of Care  Goal: Patient-Specific Goal (Individualized)  Outcome: Ongoing, Progressing     Problem: Adult Inpatient Plan of Care  Goal: Patient-Specific Goal (Individualized)  Outcome: Ongoing, Progressing     Problem: Pain Acute  Goal: Acceptable Pain Control and Functional Ability  Outcome: Ongoing, Progressing  Intervention: Develop Pain Management Plan  Flowsheets (Taken 3/2/2024 0815)  Pain Management Interventions:   around-the-clock dosing utilized   awakened for pain meds per patient request   care clustered   quiet environment facilitated     Problem: Pain Acute  Goal: Acceptable Pain Control and Functional Ability  Outcome: Ongoing, Progressing     Problem: Pain Acute  Goal: Acceptable Pain Control and Functional Ability  Intervention: Develop Pain Management Plan  Flowsheets (Taken 3/2/2024 0815)  Pain Management Interventions:   around-the-clock dosing utilized   awakened for pain meds per patient request   care clustered   quiet environment facilitated      Problem: Pain Acute  Goal: Acceptable Pain Control and Functional Ability  Intervention: Develop Pain Management Plan  Flowsheets (Taken 3/2/2024 3615)  Pain Management Interventions:   around-the-clock dosing utilized   awakened for pain meds per patient request   care clustered   quiet environment facilitated

## 2024-03-02 NOTE — PLAN OF CARE
Problem: Adult Inpatient Plan of Care  Goal: Plan of Care Review  Outcome: Ongoing, Progressing  Goal: Patient-Specific Goal (Individualized)  Outcome: Ongoing, Progressing  Goal: Absence of Hospital-Acquired Illness or Injury  Outcome: Ongoing, Progressing  Goal: Optimal Comfort and Wellbeing  Outcome: Ongoing, Progressing  Goal: Readiness for Transition of Care  Outcome: Ongoing, Progressing     Problem: Fluid and Electrolyte Imbalance (Acute Kidney Injury/Impairment)  Goal: Fluid and Electrolyte Balance  Outcome: Ongoing, Progressing     Problem: Oral Intake Inadequate (Acute Kidney Injury/Impairment)  Goal: Optimal Nutrition Intake  Outcome: Ongoing, Progressing     Problem: Renal Function Impairment (Acute Kidney Injury/Impairment)  Goal: Effective Renal Function  Outcome: Ongoing, Progressing     Problem: Fall Injury Risk  Goal: Absence of Fall and Fall-Related Injury  Outcome: Ongoing, Progressing     Problem: Pain Acute  Goal: Acceptable Pain Control and Functional Ability  Outcome: Ongoing, Progressing

## 2024-03-03 LAB
ANION GAP SERPL CALC-SCNC: 8 MMOL/L (ref 8–16)
BASOPHILS # BLD AUTO: 0.02 K/UL (ref 0–0.2)
BASOPHILS NFR BLD: 0.2 % (ref 0–1.9)
BUN SERPL-MCNC: 9 MG/DL (ref 6–20)
CALCIUM SERPL-MCNC: 8.7 MG/DL (ref 8.7–10.5)
CHLORIDE SERPL-SCNC: 100 MMOL/L (ref 95–110)
CO2 SERPL-SCNC: 27 MMOL/L (ref 23–29)
CREAT SERPL-MCNC: 1.1 MG/DL (ref 0.5–1.4)
DIFFERENTIAL METHOD BLD: ABNORMAL
EOSINOPHIL # BLD AUTO: 0.2 K/UL (ref 0–0.5)
EOSINOPHIL NFR BLD: 1.6 % (ref 0–8)
ERYTHROCYTE [DISTWIDTH] IN BLOOD BY AUTOMATED COUNT: 12.9 % (ref 11.5–14.5)
EST. GFR  (NO RACE VARIABLE): >60 ML/MIN/1.73 M^2
GLUCOSE SERPL-MCNC: 139 MG/DL (ref 70–110)
HCT VFR BLD AUTO: 29.3 % (ref 40–54)
HGB BLD-MCNC: 10 G/DL (ref 14–18)
IMM GRANULOCYTES # BLD AUTO: 0.07 K/UL (ref 0–0.04)
IMM GRANULOCYTES NFR BLD AUTO: 0.6 % (ref 0–0.5)
LYMPHOCYTES # BLD AUTO: 2.3 K/UL (ref 1–4.8)
LYMPHOCYTES NFR BLD: 19.9 % (ref 18–48)
MCH RBC QN AUTO: 30.6 PG (ref 27–31)
MCHC RBC AUTO-ENTMCNC: 34.1 G/DL (ref 32–36)
MCV RBC AUTO: 90 FL (ref 82–98)
MONOCYTES # BLD AUTO: 1.3 K/UL (ref 0.3–1)
MONOCYTES NFR BLD: 11.6 % (ref 4–15)
NEUTROPHILS # BLD AUTO: 7.7 K/UL (ref 1.8–7.7)
NEUTROPHILS NFR BLD: 66.1 % (ref 38–73)
NRBC BLD-RTO: 0 /100 WBC
PLATELET # BLD AUTO: 306 K/UL (ref 150–450)
PMV BLD AUTO: 9 FL (ref 9.2–12.9)
POTASSIUM SERPL-SCNC: 3.9 MMOL/L (ref 3.5–5.1)
RBC # BLD AUTO: 3.27 M/UL (ref 4.6–6.2)
SODIUM SERPL-SCNC: 135 MMOL/L (ref 136–145)
WBC # BLD AUTO: 11.58 K/UL (ref 3.9–12.7)

## 2024-03-03 PROCEDURE — 11000001 HC ACUTE MED/SURG PRIVATE ROOM

## 2024-03-03 PROCEDURE — 85025 COMPLETE CBC W/AUTO DIFF WBC: CPT | Performed by: INTERNAL MEDICINE

## 2024-03-03 PROCEDURE — 25000003 PHARM REV CODE 250

## 2024-03-03 PROCEDURE — 25000003 PHARM REV CODE 250: Performed by: STUDENT IN AN ORGANIZED HEALTH CARE EDUCATION/TRAINING PROGRAM

## 2024-03-03 PROCEDURE — 63600175 PHARM REV CODE 636 W HCPCS: Mod: JZ,JG | Performed by: STUDENT IN AN ORGANIZED HEALTH CARE EDUCATION/TRAINING PROGRAM

## 2024-03-03 PROCEDURE — 80048 BASIC METABOLIC PNL TOTAL CA: CPT | Performed by: INTERNAL MEDICINE

## 2024-03-03 PROCEDURE — 36415 COLL VENOUS BLD VENIPUNCTURE: CPT | Performed by: INTERNAL MEDICINE

## 2024-03-03 RX ORDER — BISACODYL 10 MG/1
10 SUPPOSITORY RECTAL ONCE
Status: COMPLETED | OUTPATIENT
Start: 2024-03-03 | End: 2024-03-03

## 2024-03-03 RX ORDER — POLYETHYLENE GLYCOL 3350 17 G/17G
17 POWDER, FOR SOLUTION ORAL DAILY
Status: DISCONTINUED | OUTPATIENT
Start: 2024-03-03 | End: 2024-03-04 | Stop reason: HOSPADM

## 2024-03-03 RX ADMIN — OXYCODONE AND ACETAMINOPHEN 1 TABLET: 7.5; 325 TABLET ORAL at 10:03

## 2024-03-03 RX ADMIN — POLYETHYLENE GLYCOL 3350 17 G: 17 POWDER, FOR SOLUTION ORAL at 01:03

## 2024-03-03 RX ADMIN — MORPHINE SULFATE 4 MG: 4 INJECTION, SOLUTION INTRAMUSCULAR; INTRAVENOUS at 06:03

## 2024-03-03 RX ADMIN — BISACODYL 10 MG: 10 SUPPOSITORY RECTAL at 01:03

## 2024-03-03 RX ADMIN — MORPHINE SULFATE 4 MG: 4 INJECTION, SOLUTION INTRAMUSCULAR; INTRAVENOUS at 01:03

## 2024-03-03 RX ADMIN — AMOXICILLIN AND CLAVULANATE POTASSIUM 1 TABLET: 875; 125 TABLET, FILM COATED ORAL at 08:03

## 2024-03-03 RX ADMIN — MORPHINE SULFATE 4 MG: 4 INJECTION, SOLUTION INTRAMUSCULAR; INTRAVENOUS at 09:03

## 2024-03-03 RX ADMIN — OXYCODONE AND ACETAMINOPHEN 1 TABLET: 7.5; 325 TABLET ORAL at 03:03

## 2024-03-03 RX ADMIN — OXYCODONE AND ACETAMINOPHEN 1 TABLET: 7.5; 325 TABLET ORAL at 08:03

## 2024-03-03 RX ADMIN — OXYCODONE AND ACETAMINOPHEN 1 TABLET: 7.5; 325 TABLET ORAL at 06:03

## 2024-03-03 RX ADMIN — OXYCODONE AND ACETAMINOPHEN 1 TABLET: 7.5; 325 TABLET ORAL at 12:03

## 2024-03-03 NOTE — NURSING
[facility] [place]  Nurses Note -- 4 Eyes      3/2/2024       Skin assessed on: Q Shift      [x] No Pressure Injuries Present    [x]Prevention Measures Documented    [] Yes LDA  for Pressure Injury Previously documented     [] Yes New Pressure Injury Discovered   [] LDA for New Pressure Injury Added      Attending RN:  Farnaz Farfan RN     Second RN:  EDILMA Schumacher

## 2024-03-03 NOTE — NURSING
[facility], [facility] [place]  Nurses Note -- 4 Eyes      3/3/2024       Skin assessed on: Q Shift      [x] No Pressure Injuries Present    []Prevention Measures Documented    [] Yes LDA  for Pressure Injury Previously documented     [] Yes New Pressure Injury Discovered   [] LDA for New Pressure Injury Added      Attending RN:  Winsome Velásquez RN     Second RN: Farnaz

## 2024-03-03 NOTE — ASSESSMENT & PLAN NOTE
-Happened after first nasal packing.  -Most likely vasovagal.  -Echocardiogram for completion. Normal EF     Return to the ER or call your pediatrician if your child has a fever more than 102.2 for 2 more days, if your child will not stop crying, or if your child stops peeing for more than 8 hours or stops feeding for more than 2 feedings, has trouble breathing or if he does not wake up or if you have any other concerns.

## 2024-03-03 NOTE — PLAN OF CARE
Problem: Adult Inpatient Plan of Care  Goal: Plan of Care Review  Outcome: Ongoing, Progressing  Flowsheets (Taken 3/3/2024 0815)  Plan of Care Reviewed With: patient  Goal: Patient-Specific Goal (Individualized)  Outcome: Ongoing, Progressing     Problem: Pain Acute  Goal: Acceptable Pain Control and Functional Ability  Outcome: Ongoing, Progressing  Intervention: Develop Pain Management Plan  Flowsheets (Taken 3/3/2024 0815)  Pain Management Interventions:   around-the-clock dosing utilized   awakened for pain meds per patient request   care clustered   quiet environment facilitated     Problem: Adult Inpatient Plan of Care  Goal: Plan of Care Review  Outcome: Ongoing, Progressing  Flowsheets (Taken 3/3/2024 0815)  Plan of Care Reviewed With: patient     Problem: Adult Inpatient Plan of Care  Goal: Plan of Care Review  Outcome: Ongoing, Progressing  Flowsheets (Taken 3/3/2024 0815)  Plan of Care Reviewed With: patient     Problem: Adult Inpatient Plan of Care  Goal: Patient-Specific Goal (Individualized)  Outcome: Ongoing, Progressing     Problem: Adult Inpatient Plan of Care  Goal: Patient-Specific Goal (Individualized)  Outcome: Ongoing, Progressing     Problem: Pain Acute  Goal: Acceptable Pain Control and Functional Ability  Outcome: Ongoing, Progressing  Intervention: Develop Pain Management Plan  Flowsheets (Taken 3/3/2024 0815)  Pain Management Interventions:   around-the-clock dosing utilized   awakened for pain meds per patient request   care clustered   quiet environment facilitated     Problem: Pain Acute  Goal: Acceptable Pain Control and Functional Ability  Outcome: Ongoing, Progressing     Problem: Pain Acute  Goal: Acceptable Pain Control and Functional Ability  Intervention: Develop Pain Management Plan  Flowsheets (Taken 3/3/2024 0815)  Pain Management Interventions:   around-the-clock dosing utilized   awakened for pain meds per patient request   care clustered   quiet environment facilitated      Problem: Pain Acute  Goal: Acceptable Pain Control and Functional Ability  Intervention: Develop Pain Management Plan  Flowsheets (Taken 3/3/2024 2715)  Pain Management Interventions:   around-the-clock dosing utilized   awakened for pain meds per patient request   care clustered   quiet environment facilitated

## 2024-03-03 NOTE — PROGRESS NOTES
[facility] Medicine  Progress Note    Patient Name: Mark Maza  MRN: [id number]  Patient Class: IP- Inpatient   Admission Date: 2/29/2024  Length of Stay: 3 days  Attending Physician: Octavio Glynn MD  Primary Care Provider: Francisca, Primary Doctor        Subjective:     Principal Problem:Epistaxis        HPI:  Mr Maza is a pleasant 48-year-old male who presents with epistaxis.  Denies any medical conditions, does not take any scheduled medications, reports that he had surgery for deviated septum on Friday, 2/23/2024, and was doing well postoperatively, did not have any packing or splints after surgery.  Denies any prior other head and neck surgeries.  Denies any hematological issues or bleeding disorders.  Today he started experiencing severe bleeding through his nostrils that prompted him to come to the emergency room.  Denies taking any anticoagulants but states that he took Excedrin migraine for pain control.    On arrival in the ER, he was noted to be tachycardic, afebrile.  CBC shows white count of over 15,000, probably reactive.  Hemoglobin 15.7 and normal platelets.  BMP with creatinine of 1.5 without previous known renal issues.    He was promptly evaluated by ENT at bedside in the ER, and Dr. Quiroz evaluated him first time with subsequent control of bleeding with Afrin, absorbable packing however shortly thereafter he had what appears to be a syncopal event (less likely seizure) and his packing came out.  ENT came by to see the patient again due to uncontrolled bleeding and a rigid endoscopy was performed, no active bleeding was noted, left-sided Surgicel was placed.  Antibiotics not recommended if absorbable packing in place, but if he needs a Rhino Rocket future he may need antibiotics.    Due to recurrent nature of his bleeding, and syncope, admission was requested for further evaluation and treatment.    Overview/Hospital Course:  Admitted with epistaxis after septoplasty.  ENT consulted and packed in ED. Developed acute bleeding on 3/1 and ENT urgently at bedside and packed bilaterally. Started on Augmentin. Hb has dropped during this admission due to significant bleeding.     Interval History: having uvula swelling but no difficulty breathing. Able to eat. No further acute bleeding    Review of Systems  Objective:     Vital Signs (Most Recent):  Temp: 98.3 °F (36.8 °C) (03/03/24 1119)  Pulse: 88 (03/03/24 1129)  Resp: 18 (03/03/24 1351)  BP: 135/82 (03/03/24 1119)  SpO2: 99 % (03/03/24 1119) Vital Signs (24h Range):  Temp:  [97.9 °F (36.6 °C)-99.1 °F (37.3 °C)] 98.3 °F (36.8 °C)  Pulse:  [] 88  Resp:  [18-20] 18  SpO2:  [97 %-99 %] 99 %  BP: (134-155)/(80-97) 135/82     Weight: 104.6 kg (230 lb 9.6 oz)  Body mass index is 34.05 kg/m².    Intake/Output Summary (Last 24 hours) at 3/3/2024 1446  Last data filed at 3/3/2024 1300  Gross per 24 hour   Intake 1920 ml   Output 500 ml   Net 1420 ml           Physical Exam  Vitals and nursing note reviewed.   Constitutional:       General: He is not in acute distress.     Appearance: He is ill-appearing.   HENT:      Head:      Comments: Bilateral nasal packing in place with outside gauze bandages intact  Cardiovascular:      Rate and Rhythm: Normal rate and regular rhythm.      Pulses: Normal pulses.   Pulmonary:      Effort: Pulmonary effort is normal.   Abdominal:      General: There is no distension.   Musculoskeletal:         General: No swelling.   Skin:     General: Skin is warm.   Neurological:      General: No focal deficit present.      Mental Status: He is alert.   Psychiatric:         Mood and Affect: Mood normal.         Thought Content: Thought content normal.             Significant Labs: All pertinent labs within the past 24 hours have been reviewed.    Significant Imaging: I have reviewed all pertinent imaging results/findings within the past 24 hours.    Assessment/Plan:      * Epistaxis  -post septoplasty 6 days  ago.  -ENT evaluated the patient.  Follow further recommendations.  -Bleeding controlled after 2 interventions in the ER.  - Repeat bleeding on morning of 3/1 requiring bilateral nasal packing by ENT. These will need to stay in place over the weekend and plan to monitor patient. Started on augmentin for staph aureus coverage. Discussed with Dr. Quiroz.  -Monitor hemoglobin.      Leukocytosis  -Likely reactive.  -resolved        Syncope and collapse  -Happened after first nasal packing.  -Most likely vasovagal.  -Echocardiogram for completion. Normal EF      Acute kidney injury  -Continue IV fluids, likely prerenal.  - resolved      VTE Risk Mitigation (From admission, onward)           Ordered     IP VTE LOW RISK PATIENT  Once         02/29/24 1949     Place sequential compression device  Until discontinued         02/29/24 1949                    Discharge Planning   ELISEO:      Code Status: Full Code   Is the patient medically ready for discharge?:     Reason for patient still in hospital (select all that apply): Treatment  Discharge Plan A: Home                  Octavio Glynn MD  Department of Hospital Medicine   [facility]

## 2024-03-03 NOTE — HOSPITAL COURSE
Admitted with epistaxis after septoplasty. ENT consulted and packed in ED. Developed acute bleeding on 3/1 and ENT urgently at bedside and packed bilaterally. Started on Augmentin. Hb has dropped during this admission due to significant bleeding. Vitals stable. Right nasal packing removed on 3/4, no further bleeding. Ok to discharge per ENT and plans for follow up in clinic on 3/6 at 4:15 pm with Dr. Quiroz. Prescribed pain medications and PO antibiotics. Discharged home in stable condition.

## 2024-03-03 NOTE — SUBJECTIVE & OBJECTIVE
Interval History: having uvula swelling but no difficulty breathing. Able to eat. No further acute bleeding    Review of Systems  Objective:     Vital Signs (Most Recent):  Temp: 98.3 °F (36.8 °C) (03/03/24 1119)  Pulse: 88 (03/03/24 1129)  Resp: 18 (03/03/24 1351)  BP: 135/82 (03/03/24 1119)  SpO2: 99 % (03/03/24 1119) Vital Signs (24h Range):  Temp:  [97.9 °F (36.6 °C)-99.1 °F (37.3 °C)] 98.3 °F (36.8 °C)  Pulse:  [] 88  Resp:  [18-20] 18  SpO2:  [97 %-99 %] 99 %  BP: (134-155)/(80-97) 135/82     Weight: 104.6 kg (230 lb 9.6 oz)  Body mass index is 34.05 kg/m².    Intake/Output Summary (Last 24 hours) at 3/3/2024 1446  Last data filed at 3/3/2024 1300  Gross per 24 hour   Intake 1920 ml   Output 500 ml   Net 1420 ml           Physical Exam  Vitals and nursing note reviewed.   Constitutional:       General: He is not in acute distress.     Appearance: He is ill-appearing.   HENT:      Head:      Comments: Bilateral nasal packing in place with outside gauze bandages intact  Cardiovascular:      Rate and Rhythm: Normal rate and regular rhythm.      Pulses: Normal pulses.   Pulmonary:      Effort: Pulmonary effort is normal.   Abdominal:      General: There is no distension.   Musculoskeletal:         General: No swelling.   Skin:     General: Skin is warm.   Neurological:      General: No focal deficit present.      Mental Status: He is alert.   Psychiatric:         Mood and Affect: Mood normal.         Thought Content: Thought content normal.             Significant Labs: All pertinent labs within the past 24 hours have been reviewed.    Significant Imaging: I have reviewed all pertinent imaging results/findings within the past 24 hours.

## 2024-03-04 VITALS
WEIGHT: 230.63 LBS | DIASTOLIC BLOOD PRESSURE: 68 MMHG | HEIGHT: 69 IN | RESPIRATION RATE: 18 BRPM | BODY MASS INDEX: 34.16 KG/M2 | OXYGEN SATURATION: 95 % | TEMPERATURE: 98 F | SYSTOLIC BLOOD PRESSURE: 121 MMHG | HEART RATE: 104 BPM

## 2024-03-04 PROCEDURE — 99231 SBSQ HOSP IP/OBS SF/LOW 25: CPT | Mod: ,,, | Performed by: OTOLARYNGOLOGY

## 2024-03-04 PROCEDURE — 25000003 PHARM REV CODE 250: Performed by: STUDENT IN AN ORGANIZED HEALTH CARE EDUCATION/TRAINING PROGRAM

## 2024-03-04 PROCEDURE — 63600175 PHARM REV CODE 636 W HCPCS: Mod: JZ,JG | Performed by: STUDENT IN AN ORGANIZED HEALTH CARE EDUCATION/TRAINING PROGRAM

## 2024-03-04 RX ORDER — OXYCODONE AND ACETAMINOPHEN 7.5; 325 MG/1; MG/1
1 TABLET ORAL EVERY 8 HOURS PRN
Qty: 15 TABLET | Refills: 0 | Status: SHIPPED | OUTPATIENT
Start: 2024-03-04 | End: 2024-03-09

## 2024-03-04 RX ORDER — AMOXICILLIN AND CLAVULANATE POTASSIUM 875; 125 MG/1; MG/1
1 TABLET, FILM COATED ORAL EVERY 12 HOURS
Qty: 14 TABLET | Refills: 0 | Status: SHIPPED | OUTPATIENT
Start: 2024-03-04 | End: 2024-03-11

## 2024-03-04 RX ADMIN — MORPHINE SULFATE 4 MG: 4 INJECTION, SOLUTION INTRAMUSCULAR; INTRAVENOUS at 11:03

## 2024-03-04 RX ADMIN — OXYCODONE AND ACETAMINOPHEN 1 TABLET: 7.5; 325 TABLET ORAL at 09:03

## 2024-03-04 RX ADMIN — OXYCODONE AND ACETAMINOPHEN 1 TABLET: 7.5; 325 TABLET ORAL at 12:03

## 2024-03-04 RX ADMIN — OXYCODONE AND ACETAMINOPHEN 1 TABLET: 7.5; 325 TABLET ORAL at 05:03

## 2024-03-04 RX ADMIN — AMOXICILLIN AND CLAVULANATE POTASSIUM 1 TABLET: 875; 125 TABLET, FILM COATED ORAL at 09:03

## 2024-03-04 RX ADMIN — OXYCODONE AND ACETAMINOPHEN 1 TABLET: 7.5; 325 TABLET ORAL at 03:03

## 2024-03-04 NOTE — PLAN OF CARE
Problem: Adult Inpatient Plan of Care  Goal: Plan of Care Review  Outcome: Ongoing, Progressing  Flowsheets (Taken 3/4/2024 0356)  Plan of Care Reviewed With: patient     Problem: Fall Injury Risk  Goal: Absence of Fall and Fall-Related Injury  Outcome: Ongoing, Progressing  Intervention: Identify and Manage Contributors  Flowsheets (Taken 3/4/2024 0356)  Self-Care Promotion:   independence encouraged   BADL personal objects within reach     Problem: Pain Acute  Goal: Acceptable Pain Control and Functional Ability  Outcome: Ongoing, Progressing  Intervention: Develop Pain Management Plan  Flowsheets (Taken 3/4/2024 0356)  Pain Management Interventions: pain management plan reviewed with patient/caregiver

## 2024-03-04 NOTE — NURSING
[facility]  Nurses Note -- 4 Eyes      3/4/2024       Skin assessed on: Q Shift      [x] No Pressure Injuries Present    []Prevention Measures Documented    [] Yes LDA  for Pressure Injury Previously documented     [] Yes New Pressure Injury Discovered   [] LDA for New Pressure Injury Added      Attending RN:  Ibis Faith RN     Second RN:  EDILMA Hou

## 2024-03-04 NOTE — NURSING
Pt discharged per MD order. IV removed. Catheter tip intact. Telemetry box removed. No distress noted. AVS given to patient, discharge instructions explained per discharge nurse. Pt verbalized understanding. VSS. Afebrile. No complaints of pain, N/V, bleeding, diarrhea, or SOB. Pt left with belongings to Main Entrance via wheelchair per transport. Family with patient.

## 2024-03-04 NOTE — PLAN OF CARE
Problem: Adult Inpatient Plan of Care  Goal: Plan of Care Review  Outcome: Adequate for Care Transition  Goal: Patient-Specific Goal (Individualized)  Outcome: Adequate for Care Transition  Goal: Absence of Hospital-Acquired Illness or Injury  Outcome: Adequate for Care Transition  Goal: Optimal Comfort and Wellbeing  Outcome: Adequate for Care Transition  Goal: Readiness for Transition of Care  Outcome: Adequate for Care Transition     Problem: Fluid and Electrolyte Imbalance (Acute Kidney Injury/Impairment)  Goal: Fluid and Electrolyte Balance  Outcome: Adequate for Care Transition     Problem: Oral Intake Inadequate (Acute Kidney Injury/Impairment)  Goal: Optimal Nutrition Intake  Outcome: Adequate for Care Transition     Problem: Renal Function Impairment (Acute Kidney Injury/Impairment)  Goal: Effective Renal Function  Outcome: Adequate for Care Transition     Problem: Fall Injury Risk  Goal: Absence of Fall and Fall-Related Injury  Outcome: Adequate for Care Transition     Problem: Pain Acute  Goal: Acceptable Pain Control and Functional Ability  Outcome: Adequate for Care Transition

## 2024-03-04 NOTE — NURSING
"Pt sitting up in bed, reporting 7/10 pain, also requesting pain medication at this time, educated pt of pain regimen, pt verbalized understanding. Family at bedside. Dressing to nose is dry, clean and intact. Pt stated that "my wife and I changed the outside tape" urinal and call light in reach, instructed pt to call for assistance.       [facility]  Nurses Note -- 4 Eyes      3/3/2024       Skin assessed on: Q Shift      [x] No Pressure Injuries Present    [x]Prevention Measures Documented    [] Yes LDA  for Pressure Injury Previously documented     [] Yes New Pressure Injury Discovered   [] LDA for New Pressure Injury Added      Attending RN:  Savi Paredes RN     Second RN:  EDILMA Schumacher       "

## 2024-03-04 NOTE — PROGRESS NOTES
[facility]  Otorhinolaryngology-Head & Neck Surgery  Progress Note    Patient Name: Mark Maza  MRN: [id number]  Code Status: Full Code  Admission Date: 2/29/2024  Hospital Length of Stay: 4 days  Attending Physician: Octavio Glynn MD  Primary Care Provider: Francisca, Primary Doctor    Subjective:     Interval History: no bleeding over the weekend    Post-Op Info:  * No surgery found *      Hospital Day: 5      Medications:  Continuous Infusions:  Scheduled Meds:   amoxicillin-clavulanate 875-125mg  1 tablet Oral Q12H    polyethylene glycol  17 g Oral Daily     PRN Meds:acetaminophen, aluminum-magnesium hydroxide-simethicone, dextrose 10%, dextrose 10%, glucagon (human recombinant), glucose, glucose, melatonin, morphine, naloxone, ondansetron, oxyCODONE-acetaminophen, sodium chloride 0.9%     Objective:     Vital Signs (24h Range):  Temp:  [97.6 °F (36.4 °C)-97.9 °F (36.6 °C)] 97.8 °F (36.6 °C)  Pulse:  [] 108  Resp:  [18-20] 18  SpO2:  [95 %-100 %] 95 %  BP: (121-176)/(68-91) 121/68     Date 03/04/24 0700 - 03/05/24 0659   Shift 2937-2856 1219-2308 4188-3254 24 Hour Total   INTAKE   Shift Total(mL/kg)       OUTPUT   Urine(mL/kg/hr) 1   1   Emesis/NG output 0   0   Shift Total(mL/kg) 1(0)   1(0)   Weight (kg) 104.6 104.6 104.6 104.6     Lines/Drains/Airways       Peripheral Intravenous Line  Duration                  Peripheral IV - Single Lumen 02/29/24 1429 20 G Left Antecubital 3 days                    Physical Exam  HENT:      Head:      Comments: Bilateral tee merocels in place   Right merocel packing removed without incident        Mouth/Throat:      Comments: No blood in oropharynx.  Uvula edema - secondary to snoring suspect   Neurological:      Mental Status: He is alert.         Significant Labs:  CBC:   Recent Labs   Lab 03/03/24  0610   WBC 11.58   RBC 3.27*   HGB 10.0*   HCT 29.3*      MCV 90   MCH 30.6   MCHC 34.1         Assessment/Plan:     Active Diagnoses:    Diagnosis Date  Noted POA    PRINCIPAL PROBLEM:  Epistaxis [R04.0] 02/29/2024 Yes    Acute kidney injury [N17.9] 02/29/2024 Yes    Syncope and collapse [R55] 02/29/2024 Yes    Leukocytosis [D72.829] 02/29/2024 Yes      Problems Resolved During this Admission:   Uvula edema - secondary to snoring suspect given packed nose bilaterally and has appearance of what uvula looks like with people that snore.     Right packing removed no rebleed at this time.   No digital manipulation. No nose blowing. Saline spray and moustache dressing    Monitor for a couple of hours and if no rebleed then can dc home. If rebleeds call please for eval     Return Wednesday 415 pm for removal of left pack     Davina Quiroz MD  Otorhinolaryngology-Head & Neck Surgery  [facility] - Med Surg

## 2024-03-04 NOTE — PLAN OF CARE
Case Management Final Discharge Note      Discharge Disposition: Home    New DME ordered / company name: None    Relevant SDOH / Transition of Care Barriers:  None    Primary Caretaker and contact information:     Scheduled followup appointment: ENT    Referrals placed: [facility]    Transportation: family        Patient and family educated on discharge services and updated on DC plan. Bedside RN notified, patient clear to discharge from Case Management Perspective.      03/04/24 1354   Final Note   Assessment Type Final Discharge Note   Anticipated Discharge Disposition Home   What phone number can be called within the next 1-3 days to see how you are doing after discharge? [phone]   Hospital Resources/Appts/Education Provided Post-Acute resouces added to AVS   Post-Acute Status   Post-Acute Authorization Other   Coverage BCBS   Other Status No Post-Acute Service Needs

## 2024-03-04 NOTE — DISCHARGE SUMMARY
[facility] Medicine  Discharge Summary      Patient Name: Mark Maza  MRN: [id number]  [facility]: [id number]  Patient Class: IP- Inpatient  Admission Date: 2/29/2024  Hospital Length of Stay: 4 days  Discharge Date and Time: 3/4/2024  4:07 PM  Attending Physician: Francisca att. providers found   Discharging Provider: Octavio Glynn MD  Primary Care Provider: Francisca, Primary Doctor    Primary Care Team: Networked reference to record PCT     HPI:   Mr Maza is a pleasant 48-year-old male who presents with epistaxis.  Denies any medical conditions, does not take any scheduled medications, reports that he had surgery for deviated septum on Friday, 2/23/2024, and was doing well postoperatively, did not have any packing or splints after surgery.  Denies any prior other head and neck surgeries.  Denies any hematological issues or bleeding disorders.  Today he started experiencing severe bleeding through his nostrils that prompted him to come to the emergency room.  Denies taking any anticoagulants but states that he took Excedrin migraine for pain control.    On arrival in the ER, he was noted to be tachycardic, afebrile.  CBC shows white count of over 15,000, probably reactive.  Hemoglobin 15.7 and normal platelets.  BMP with creatinine of 1.5 without previous known renal issues.    He was promptly evaluated by ENT at bedside in the ER, and Dr. Quiroz evaluated him first time with subsequent control of bleeding with Afrin, absorbable packing however shortly thereafter he had what appears to be a syncopal event (less likely seizure) and his packing came out.  ENT came by to see the patient again due to uncontrolled bleeding and a rigid endoscopy was performed, no active bleeding was noted, left-sided Surgicel was placed.  Antibiotics not recommended if absorbable packing in place, but if he needs a Rhino Rocket future he may need antibiotics.    Due to recurrent nature of his bleeding, and syncope, admission  was requested for further evaluation and treatment.    * No surgery found *      Hospital Course:   Admitted with epistaxis after septoplasty. ENT consulted and packed in ED. Developed acute bleeding on 3/1 and ENT urgently at bedside and packed bilaterally. Started on Augmentin. Hb has dropped during this admission due to significant bleeding. Vitals stable. Right nasal packing removed on 3/4, no further bleeding. Ok to discharge per ENT and plans for follow up in clinic on 3/6 at 4:15 pm with Dr. Quiroz. Prescribed pain medications and PO antibiotics. Discharged home in stable condition.     Goals of Care Treatment Preferences:  Code Status: Full Code      Consults:   Consults (From admission, onward)          Status Ordering Provider     Inpatient consult to ENT  Once        Provider:  Davina Quiroz MD    Acknowledged JOHN ELIZABETH            No new Assessment & Plan notes have been filed under this hospital service since the last note was generated.  Service: Hospital Medicine    Final Active Diagnoses:    Diagnosis Date Noted POA    PRINCIPAL PROBLEM:  Epistaxis [R04.0] 02/29/2024 Yes    Acute kidney injury [N17.9] 02/29/2024 Yes    Syncope and collapse [R55] 02/29/2024 Yes    Leukocytosis [D72.829] 02/29/2024 Yes      Problems Resolved During this Admission:       Discharged Condition: stable    Disposition: Home or Self Care    Follow Up:    Patient Instructions:      Diet Adult Regular     Notify your health care provider if you experience any of the following:  temperature >100.4     Notify your health care provider if you experience any of the following:  persistent nausea and vomiting or diarrhea     Notify your health care provider if you experience any of the following:  severe uncontrolled pain     Notify your health care provider if you experience any of the following:  difficulty breathing or increased cough     Notify your health care provider if you experience any of the  following:  severe persistent headache     Notify your health care provider if you experience any of the following:  worsening rash     Notify your health care provider if you experience any of the following:  persistent dizziness, light-headedness, or visual disturbances     Notify your health care provider if you experience any of the following:  increased confusion or weakness     Activity as tolerated       Significant Diagnostic Studies: Labs: All labs within the past 24 hours have been reviewed    Pending Diagnostic Studies:       None           Medications:  Reconciled Home Medications:      Medication List        START taking these medications      amoxicillin-clavulanate 875-125mg 875-125 mg per tablet  Commonly known as: AUGMENTIN  Take 1 tablet by mouth every 12 (twelve) hours. for 7 days     oxyCODONE-acetaminophen 7.5-325 mg per tablet  Commonly known as: PERCOCET  Take 1 tablet by mouth every 8 (eight) hours as needed for Pain.              Indwelling Lines/Drains at time of discharge:   Lines/Drains/Airways       None                   Time spent on the discharge of patient: >35 minutes         Octavio Glynn MD  Department of Hospital Medicine  [facility]

## 2024-03-06 ENCOUNTER — OFFICE VISIT (OUTPATIENT)
Dept: OTOLARYNGOLOGY | Facility: CLINIC | Age: 39
End: 2024-03-06
Payer: COMMERCIAL

## 2024-03-06 VITALS
DIASTOLIC BLOOD PRESSURE: 88 MMHG | WEIGHT: 230 LBS | BODY MASS INDEX: 34.07 KG/M2 | HEIGHT: 69 IN | SYSTOLIC BLOOD PRESSURE: 132 MMHG

## 2024-03-06 DIAGNOSIS — R04.0 EPISTAXIS: Primary | ICD-10-CM

## 2024-03-06 DIAGNOSIS — Z48.00 ENCOUNTER FOR REMOVAL OF NASAL PACK: ICD-10-CM

## 2024-03-06 PROCEDURE — 1159F MED LIST DOCD IN RCRD: CPT | Mod: CPTII,S$GLB,, | Performed by: OTOLARYNGOLOGY

## 2024-03-06 PROCEDURE — 99213 OFFICE O/P EST LOW 20 MIN: CPT | Mod: S$GLB,,, | Performed by: OTOLARYNGOLOGY

## 2024-03-06 PROCEDURE — 1111F DSCHRG MED/CURRENT MED MERGE: CPT | Mod: CPTII,S$GLB,, | Performed by: OTOLARYNGOLOGY

## 2024-03-06 PROCEDURE — 3079F DIAST BP 80-89 MM HG: CPT | Mod: CPTII,S$GLB,, | Performed by: OTOLARYNGOLOGY

## 2024-03-06 PROCEDURE — 3008F BODY MASS INDEX DOCD: CPT | Mod: CPTII,S$GLB,, | Performed by: OTOLARYNGOLOGY

## 2024-03-06 PROCEDURE — 3075F SYST BP GE 130 - 139MM HG: CPT | Mod: CPTII,S$GLB,, | Performed by: OTOLARYNGOLOGY

## 2024-03-06 RX ORDER — MUPIROCIN 20 MG/G
OINTMENT TOPICAL 2 TIMES DAILY
Qty: 1 EACH | Refills: 0 | Status: SHIPPED | OUTPATIENT
Start: 2024-03-06 | End: 2024-03-06

## 2024-03-06 RX ORDER — MUPIROCIN 20 MG/G
OINTMENT TOPICAL 2 TIMES DAILY
Qty: 1 EACH | Refills: 0 | Status: SHIPPED | OUTPATIENT
Start: 2024-03-06 | End: 2024-03-20

## 2024-03-06 RX ORDER — SUMATRIPTAN SUCCINATE 100 MG/1
100 TABLET ORAL
COMMUNITY
Start: 2024-02-27

## 2024-03-06 RX ORDER — TOPIRAMATE 200 MG/1
100 TABLET ORAL
COMMUNITY
Start: 2024-02-27

## 2024-03-06 NOTE — PROGRESS NOTES
"OTOLARYNGOLOGY CLINIC NOTE  Date:  03/06/2024     Chief complaint:  Chief Complaint   Patient presents with    Epistaxis     Hospital follow up left nasal packing removal       History of Present Illness  Mark Maza is a 38 y.o. male  presenting today for a followup.    Had bilateral packs placed in hospital. Removed right side on Monday and dc'd from hospital   No further bleeding   Getting f/u with surgeon dr rosales s/p septoplasty   Doing well overall . Uvula feeling better     Past Medical History  No past medical history on file.     Past Surgical History  No past surgical history on file.     Medications  Current Outpatient Medications on File Prior to Visit   Medication Sig Dispense Refill    amoxicillin-clavulanate 875-125mg (AUGMENTIN) 875-125 mg per tablet Take 1 tablet by mouth every 12 (twelve) hours. for 7 days 14 tablet 0    oxyCODONE-acetaminophen (PERCOCET) 7.5-325 mg per tablet Take 1 tablet by mouth every 8 (eight) hours as needed for Pain. 15 tablet 0    sumatriptan (IMITREX) 100 MG tablet 100 mg.      topiramate (TOPAMAX) 200 MG Tab 100 mg.       No current facility-administered medications on file prior to visit.       Review of Systems  Review of Systems   Constitutional:  Negative for fever.   HENT:  Negative for nosebleeds.    Respiratory:  Negative for hemoptysis.    Musculoskeletal:  Negative for neck pain.   Neurological:  Negative for dizziness and headaches.        Social History   reports that he has quit smoking. His smoking use included cigarettes. He has never used smokeless tobacco. He reports current alcohol use.     Family History  No family history on file.     Physical Exam   Vitals:    03/06/24 1547   BP: 132/88    Body mass index is 33.97 kg/m².  Weight: 104.3 kg (230 lb)   Height: 5' 9" (175.3 cm)     GENERAL: no acute distress.  HEAD: normocephalic.   EYES: No scleral icterus  EARS: external ear without lesion, normal pinna shape and position.    NOSE: external nose " without significant bony abnormality pack in left nare removed, septum midline no bleeding  ORAL CAVITY/OROPHARYNX: tongue mobile. Uvula swelling improved   NECK: trachea midline.   LYMPH NODES:No cervical lymphadenopathy.  RESPIRATORY: no stridor, no stertor. Voice normal. Respirations nonlabored.  NEURO: alert, responds to questions appropriately.    PSYCH:mood appropriate      Imaging:  The patient does not have any new imaging of the head and neck since last visit.     Labs:  CBC  Recent Labs   Lab 03/01/24 0419 03/02/24 0622 03/03/24  0610   WBC 9.70 12.71 H 11.58   Hemoglobin 12.2 L 11.1 L 10.0 L   Hematocrit 36.2 L 32.7 L 29.3 L   MCV 91 89 90   Platelets 311 300 306     BMP  Recent Labs   Lab 03/01/24 0419 03/02/24 0622 03/03/24  0610   Glucose 99 112 H 139 H   Sodium 137 135 L 135 L   Potassium 4.0 3.6 3.9   Chloride 108 104 100   CO2 22 L 28 27   BUN 29 H 13 9   Creatinine 1.1 0.9 1.1   Calcium 8.2 L 8.4 L 8.7     COAGS  Recent Labs   Lab 02/29/24  1330   INR 1.0       Assessment  1. Epistaxis    2. Encounter for removal of nasal pack       Plan:  Discussed plan of care with patient in detail and all questions answered. Patient reported understanding of plan of care. I gave the patient the opportunity to ask questions and patient confirmed all questions answered to satisfaction.     Nasal packing removed  No bleeding   Has f/u at [facility] with dr rosales for septoplasty  Counseled on prevention strategies for bleeding    F/u prn    I spent a total of 20 minutes on the day of the visit.  This includes face to face time and non-face to face time preparing to see the patient (eg, review of tests), obtaining and/or reviewing separately obtained history, documenting clinical information in the electronic or other health record, independently interpreting results and communicating results to the patient/family/caregiver, or care coordinator.   Please be aware that this note has been generated with the  assistance of [employer] voice-to-text.  Please excuse any spelling or grammatical errors.

## 2024-06-03 PROBLEM — N17.9 ACUTE KIDNEY INJURY: Status: RESOLVED | Noted: 2024-02-29 | Resolved: 2024-06-03
